# Patient Record
Sex: MALE | Race: WHITE | HISPANIC OR LATINO | Employment: OTHER | URBAN - METROPOLITAN AREA
[De-identification: names, ages, dates, MRNs, and addresses within clinical notes are randomized per-mention and may not be internally consistent; named-entity substitution may affect disease eponyms.]

---

## 2017-01-04 ENCOUNTER — OFFICE VISIT (OUTPATIENT)
Dept: CARDIOLOGY | Facility: MEDICAL CENTER | Age: 72
End: 2017-01-04
Payer: MEDICARE

## 2017-01-04 VITALS
BODY MASS INDEX: 25.48 KG/M2 | DIASTOLIC BLOOD PRESSURE: 90 MMHG | HEART RATE: 72 BPM | SYSTOLIC BLOOD PRESSURE: 144 MMHG | HEIGHT: 71 IN | OXYGEN SATURATION: 97 % | WEIGHT: 182 LBS

## 2017-01-04 DIAGNOSIS — E78.5 DYSLIPIDEMIA: ICD-10-CM

## 2017-01-04 DIAGNOSIS — I42.0 DILATED CARDIOMYOPATHY (HCC): ICD-10-CM

## 2017-01-04 DIAGNOSIS — Z13.6 SCREENING FOR ABDOMINAL AORTIC ANEURYSM: ICD-10-CM

## 2017-01-04 DIAGNOSIS — I10 ESSENTIAL HYPERTENSION: ICD-10-CM

## 2017-01-04 DIAGNOSIS — Z95.0 CARDIAC PACEMAKER IN SITU: ICD-10-CM

## 2017-01-04 PROCEDURE — 99214 OFFICE O/P EST MOD 30 MIN: CPT | Performed by: INTERNAL MEDICINE

## 2017-01-04 RX ORDER — CARVEDILOL 12.5 MG/1
12.5 TABLET ORAL 2 TIMES DAILY WITH MEALS
Qty: 180 TAB | Refills: 3 | Status: SHIPPED | OUTPATIENT
Start: 2017-01-04 | End: 2018-01-29 | Stop reason: SDUPTHER

## 2017-01-04 RX ORDER — LISINOPRIL 20 MG/1
20 TABLET ORAL DAILY
Qty: 90 TAB | Refills: 3 | Status: SHIPPED | OUTPATIENT
Start: 2017-01-04 | End: 2018-03-15 | Stop reason: SDUPTHER

## 2017-01-04 RX ORDER — ATORVASTATIN CALCIUM 10 MG/1
10 TABLET, FILM COATED ORAL
Qty: 90 TAB | Refills: 3 | Status: SHIPPED | OUTPATIENT
Start: 2017-01-04 | End: 2018-01-14 | Stop reason: SDUPTHER

## 2017-01-04 ASSESSMENT — ENCOUNTER SYMPTOMS
DIZZINESS: 1
ABDOMINAL PAIN: 0
DEPRESSION: 0
CLAUDICATION: 0
HEADACHES: 0
BLOOD IN STOOL: 0
PALPITATIONS: 0
MYALGIAS: 0
ORTHOPNEA: 0
PND: 0
NERVOUS/ANXIOUS: 0
FEVER: 0
SHORTNESS OF BREATH: 0
BLURRED VISION: 0

## 2017-01-04 NOTE — MR AVS SNAPSHOT
"César Holly   2017 9:45 AM   Office Visit   MRN: [id number]    Department:  [facility]   Dept Phone:  [phone]    Description:  Male : 1945   Provider:  Lola Zamudio M.D.           Reason for Visit     Follow-Up           Allergies as of 2017     No Known Allergies      You were diagnosed with     Dilated cardiomyopathy (HCC)   [003076]       Cardiac pacemaker in situ   [V45.01.ICD-9-CM]       Essential hypertension   [5624648]       Dyslipidemia   [588259]         Vital Signs     Blood Pressure Pulse Height Weight Body Mass Index Oxygen Saturation    144/90 mmHg 72 1.803 m (5' 10.98\") 82.555 kg (182 lb) 25.40 kg/m2 97%    Smoking Status                   Current Every Day Smoker           Basic Information     Date Of Birth Sex Race Ethnicity Preferred Language    1945 Male White  Origin (Ukrainian,Australian,Djiboutian,Comoran, etc) English      Your appointments     2017  2:15 PM   NM CARDIAC STRESS TEST (30) with [facility] NM FORTE 1   [facility]    [address] [facility] [id number] [phone]           NPO for 4 hours prior to scan.  No caffeine for 24 hours prior to test (decaf, coffee, cola, tea, chocolate, Excedrin, and Anacin).  No Viagra or other ED meds for 48 hours. No Beta Blockers prior to Treadmill Stress Test. Warn patient of length of test and to bring a list of meds.            Mar 01, 2017  9:30 AM   FOLLOW UP with Lola Zamudio M.D.   [facility] (--)    [address]  [facility] [address] [phone]            2017 10:00 AM   PACER CHECK ONLY with TYRELL Gómez   [facility] (--)    [address]  [facility] [address] [phone]            2017 10:30 AM   FOLLOW UP with Lola Zamudio M.D.   [facility] (--)    [address]  [place] [id number] [phone]              Problem List              ICD-10-CM Priority Class Noted - Resolved    Dilated cardiomyopathy (HCC) I42.0 High  4/2/2012 - Present    Chronic systolic heart failure (HCC) I50.22 High  4/2/2012 - Present    Impotence of organic origin (Chronic) N52.9 Low  Unknown - Present    History of heart block (Chronic) Z86.79 High  Unknown - Present    History of syncope (Chronic) Z87.898 High  Unknown - Present    Inguinal hernia  Low  3/27/2013 - Present    Cardiac pacemaker in situ Z95.0 High  4/4/2013 - Present    SDH (subdural hematoma) (HCC) I62.00 High  9/12/2015 - Present    Rib fracture S22.39XA Medium  9/12/2015 - Present    AC separation, type 3 S43.109A Medium  9/12/2015 - Present    Hypertension I10 Medium  9/12/2015 - Present    Trauma T14.90 Low  9/13/2015 - Present      Health Maintenance        Date Due Completion Dates    COLONOSCOPY 4/22/1994 ---    IMM ZOSTER VACCINE 4/22/2004 ---    IMM PNEUMOCOCCAL 65+ (ADULT) LOW/MEDIUM RISK SERIES (1 of 2 - PCV13) 4/22/2009 ---    IMM INFLUENZA (1) 9/1/2016 ---    IMM DTaP/Tdap/Td Vaccine (2 - Td) 9/27/2026 9/27/2016            Current Immunizations     Tdap Vaccine 9/27/2016  3:27 PM      Below and/or attached are the medications your provider expects you to take. Review all of your home medications and newly ordered medications with your provider and/or pharmacist. Follow medication instructions as directed by your provider and/or pharmacist. Please keep your medication list with you and share with your provider. Update the information when medications are discontinued, doses are changed, or new medications (including over-the-counter products) are added; and carry medication information at all times in the event of emergency situations     Allergies:  No Known Allergies          Medications  Valid as of: January 04, 2017 - 10:15 AM    Generic Name  Brand Name Tablet Size Instructions for use    Aspirin (Tab) aspirin 81 MG Take 81 mg by mouth every day.        Atorvastatin Calcium (Tab) LIPITOR 10 MG Take 1 Tab by mouth every bedtime.        Carvedilol (Tab) COREG 12.5 MG Take 1 Tab by mouth 2 times a day, with meals.        Hydrocodone-Acetaminophen (Tab) NORCO  MG Take 1-2 Tabs by mouth every 6 hours as needed.        Lisinopril (Tab) PRINIVIL 20 MG Take 1 Tab by mouth every day.        Omega-3 Fatty Acids (Cap) Omega-3 Fatty Acids 1200 MG Take  by mouth. AS DIRECTED.        .                 Medicines prescribed today were sent to:     [id number]  [place], [place] - [address] [facility] [address]    [address] [place] [address]    Phone: [phone] Fax: [phone]    Open 24 Hours?: No      Medication refill instructions:       If your prescription bottle indicates you have medication refills left, it is not necessary to call your provider’s office. Please contact your pharmacy and they will refill your medication.    If your prescription bottle indicates you do not have any refills left, you may request refills at any time through one of the following ways: The online [employer] system (except Urgent Care), by calling your provider’s office, or by asking your pharmacy to contact your provider’s office with a refill request. Medication refills are processed only during regular business hours and may not be available until the next business day. Your provider may request additional information or to have a follow-up visit with you prior to refilling your medication.   *Please Note: Medication refills are assigned a new Rx number when refilled electronically. Your pharmacy may indicate that no refills were authorized even though a new prescription for the same medication is available at the pharmacy. Please request the medicine by name with the pharmacy before contacting your provider for a refill.        Your To Do List      Future Labs/Procedures Complete By Expires    COMP METABOLIC PANEL  As directed 1/4/2018    LIPID PROFILE  As directed 1/4/2018    NM-CARDIAC STRESS TEST  As directed 1/4/2018         OnlineSheetMusic Access Code: [email or web address]  Expires: 1/6/2017  8:57 AM    MyChart  A secure, online tool to manage your health information     [employer] OnlineSheetMusic® is a secure, online tool that connects you to your personalized health information from the privacy of your home -- day or night - making it very easy for you to manage your healthcare. Once the activation process is completed, you can even access your medical information using the OnlineSheetMusic marcus, which is available for free in the Apple Marcus store or Google Play store.     OnlineSheetMusic provides the following levels of access (as shown below):   My Chart Features   [facility] Doctor [facility]  Specialists [facility]  Urgent  Care Non-[facility]  Primary Care  Doctor   Email your healthcare team securely and privately 24/7 X X X    Manage appointments: schedule your next appointment; view details of past/upcoming appointments X      Request prescription refills. X      View recent personal medical records, including lab and immunizations X X X X   View health record, including health history, allergies, medications X X X X   Read reports about your outpatient visits, procedures, consult and ER notes X X X X   See your discharge summary, which is a recap of your hospital and/or ER visit that includes your diagnosis, lab results, and care plan. X X       How to register for OnlineSheetMusic:  1. Go to  [email or web address].  2. Click on the Sign Up Now box, which takes you to the New Member Sign Up page. You will need to provide the following information:  a. Enter your OnlineSheetMusic Access Code exactly as it appears at the top of this page. (You will not need to use this code after you’ve completed the sign-up process. If you do not sign up before the expiration date, you must request a new  code.)   b. Enter your date of birth.   c. Enter your home email address.   d. Click Submit, and follow the next screen’s instructions.  3. Create a Smartpay ID. This will be your Smartpay login ID and cannot be changed, so think of one that is secure and easy to remember.  4. Create a Wowsait password. You can change your password at any time.  5. Enter your Password Reset Question and Answer. This can be used at a later time if you forget your password.   6. Enter your e-mail address. This allows you to receive e-mail notifications when new information is available in Smartpay.  7. Click Sign Up. You can now view your health information.    For assistance activating your Smartpay account, call [phone]

## 2017-01-04 NOTE — Clinical Note
[facility]   [address]  [place] [address]  Phone: [phone]  Fax: [phone]              César Holly  1945    Encounter Date: 1/4/2017    Lola Zamudio M.D.          PROGRESS NOTE:  Subjective:   César Holly is a 71 y.o. male with known history of alcohol-induced cardiomyopathy, complete heart block status post pacemaker, hypertension, dyslipidemia presenting today for follow-up evaluation of cardiomyopathy.    Patient still continues to smoke about half a pack to a pack a day. Denied any complaints of exertional chest pain, pressure or tightness. No complaints of dizziness lightheadedness or loss of consciousness. Denied any complaints of palpitations orthopnea or PND. No complaints of lower extremity edema or claudication. This complain of tinnitus along with dizzy spells Brittanie De Jesus advised him to use meclizine when he has episodes of vertigo. Patient denied any complaints of myalgias while on statins.  Past Medical History   Diagnosis Date   • Hypertension    • Arthritis    • Dilated cardiomyopathy (HCC) November 2011     Echocardiogram with normal LV size and mildly decrease LV funcion, LVEF 40-45%, mild concentric LVH. Mild TR.   • Chronic systolic heart failure (HCC)     • Impotence of organic origin    • History of heart block    • History of syncope    • High cholesterol    • Pacemaker      Past Surgical History   Procedure Laterality Date   • Other orthopedic surgery       right bicep repair   • Other orthopedic surgery       right foot   • Pacemaker insertion  August 2010     [employer] Altrua 60 [id number] implanted by Dr. Lemon.   • Recovery  4/29/2016     Procedure: CATH LAB PM GENERATOR CHANGE BEATRIS MACHADO;  Surgeon: [facility];  Location: SURGERY PRE-POST PROC UNIT [facility];  Service:    • Pacemaker insertion  April 2016     Generator replacement with [employer] Accolade [id number] implanted by  "Dr. Trevino.   • Pacemaker insertion     • Other orthopedic surgery       L knee   • Other orthopedic surgery       L shoulder     History reviewed. No pertinent family history.  History   Smoking status   • Current Every Day Smoker -- 0.50 packs/day for 50 years   • Types: Cigarettes   Smokeless tobacco   • Never Used     No Known Allergies  Outpatient Encounter Prescriptions as of 1/4/2017   Medication Sig Dispense Refill   • carvedilol (COREG) 25 MG Tab Take 1 Tab by mouth 2 times a day, with meals. 60 Tab 11   • atorvastatin (LIPITOR) 10 MG Tab Take 10 mg by mouth every evening.     • lisinopril (PRINIVIL) 20 MG Tab Take 1 Tab by mouth every day. 90 Tab 0   • hydrocodone/acetaminophen (NORCO)  MG Tab Take 1-2 Tabs by mouth every 6 hours as needed.     • aspirin 81 MG tablet Take 81 mg by mouth every day.     • Omega-3 Fatty Acids (FISH OIL) 1200 MG CAPS Take  by mouth. AS DIRECTED.       No facility-administered encounter medications on file as of 1/4/2017.     Review of Systems   Constitutional: Negative for fever.   HENT: Positive for tinnitus.    Eyes: Negative for blurred vision.   Respiratory: Negative for shortness of breath.    Cardiovascular: Negative for chest pain, palpitations, orthopnea, claudication, leg swelling and PND.   Gastrointestinal: Negative for abdominal pain, blood in stool and melena.   Genitourinary: Negative for dysuria.   Musculoskeletal: Positive for joint pain. Negative for myalgias.   Skin: Negative for rash.   Neurological: Positive for dizziness. Negative for headaches.   Psychiatric/Behavioral: Negative for depression. The patient is not nervous/anxious.         Objective:   /90 mmHg  Pulse 72  Ht 1.803 m (5' 10.98\")  Wt 82.555 kg (182 lb)  BMI 25.40 kg/m2  SpO2 97%  Blood pressure 122/80, heart rate 72 O2 sats 96% on room air  Physical Exam   Constitutional: He is oriented to person, place, and time. He appears well-developed and well-nourished. No distress.   "   HENT:   Head: Normocephalic and atraumatic.   Mouth/Throat: No oropharyngeal exudate.   Eyes: Pupils are equal, round, and reactive to light. Right eye exhibits no discharge. Left eye exhibits no discharge.   Neck: No JVD present. No tracheal deviation present.   Cardiovascular: Normal rate and regular rhythm.    No murmur heard.  Pulmonary/Chest: Effort normal and breath sounds normal. No respiratory distress. He has no wheezes. He has no rales. He exhibits no tenderness.   Abdominal: Soft. Bowel sounds are normal. He exhibits no distension. There is no tenderness. There is no rebound and no guarding.   Musculoskeletal: Normal range of motion. He exhibits no edema.   Neurological: He is alert and oriented to person, place, and time. No cranial nerve deficit.   Skin: Skin is warm and dry. He is not diaphoretic. No erythema.   Psychiatric: He has a normal mood and affect.   Results for JORDAN NASH (MRN [id number]) as of 2017 09:47   2016    Sodium 136 137   Potassium 3.6 4.4   Chloride 102 99   Co2 28 30   Anion Gap 6.0 8.0   Glucose 127 (H) 129 (H)   Bun 15 16   Creatinine 0.98 1.05   GFR If Non African American >60 >60   Calcium 10.1 9.8   AST(SGOT)  20   ALT(SGPT)  12   Alkaline Phosphatase  56   Cholesterol,Tot  186   Triglycerides  67   HDL  67   LDL  106 (H)     TTE: 16  No prior study is available for comparison.   Mildly reduced left ventricular systolic function. Left ventricular ejection fraction is visually estimated to be 45 %.   [place] appears akinetic. Grade I diastolic dysfunction. Abnormal septal motion consistent with ventricular pacing.   Trace mitral regurgitation.   Estimated right ventricular systolic pressure  is 27 mmHg.  No pericardial effusion seen.   No prior study is available for comparison    EK16  EKG personally reviewed by me.  AV paced at 60 bpm    Transthoracic echo: 11  Mildly depressed LV function with EF of 40-45%. Mild global hypokinetic  with endoscopy septal motion consistent with RV pacer. Mild concentric LVH. Grade 1 diastolic dysfunction.  No significant valvular heart disease.  Pulmonary artery systolic pressure 25-30 mmHg.  Assessment:     No diagnosis found.    Medical Decision Making:  Today's Assessment / Status / Plan:     1. Blood pressure well controlled at the present visit.  Continue Coreg 25 mg BID.  Continue lisinopril 20 mg daily.  2. Patient appears euvolemic.  Currently on beta blockers and ace inhibitors.  Echo prior to next visit to assess LV function.  3. Device interrogation was done today by Brittanie showed no mode switching episodes.  Normal sensing of RA lead; unable to measure R waves. Stable capture of RA and RV leads; stable impedances. Battery longevity is 8.5 years.  4. Continue Lipitor 10 mg qHs.  Check labs prior to next visit.  5. Abdominal aortic Doppler prior to next visit.    Follow-up in 6 weeks.  Echo, abdominal aortic Doppler and labs prior to next visit.    Thank you for allowing me to participate in taking care of patient.    Lola Zamudio. MD.         Zachary Granados M.D.  [address] [facility]  [address] [address]  VIA Facsimile: [phone]

## 2017-01-05 ENCOUNTER — TELEPHONE (OUTPATIENT)
Dept: CARDIOLOGY | Facility: MEDICAL CENTER | Age: 72
End: 2017-01-05

## 2017-01-05 NOTE — TELEPHONE ENCOUNTER
----- Message from Lola Zamudio M.D. sent at 1/4/2017 11:21 AM PST -----    Forgot to mention to patient that he needs abdominal aortic Doppler to the screening abdominal aortic aneurysm.  Please let him know one of our office staff will call and schedule him for abdominal aortic Doppler.

## 2017-01-10 ENCOUNTER — TELEPHONE (OUTPATIENT)
Dept: CARDIOLOGY | Facility: MEDICAL CENTER | Age: 72
End: 2017-01-10

## 2017-01-10 NOTE — TELEPHONE ENCOUNTER
Pt. notified echo unchanged from last time - keep March appt & Dr. Zamudio will discuss in detail.

## 2017-01-10 NOTE — TELEPHONE ENCOUNTER
----- Message from Lola Zamudio M.D. sent at 12/28/2016  5:36 PM PST -----  Left ventricular ejection fraction is visually estimated to be 45 %.   Surveyor appears akinetic. Grade I diastolic dysfunction. Abnormal septal motion consistent with ventricular pacing.   Trace mitral regurgitation.   Estimated right ventricular systolic pressure  is 27 mmHg.  Unchanged from prior echo  Ansiha  ----- Message -----     From: Willard Brady     Sent: 12/28/2016   5:35 PM       To: Lola Zamudio M.D.

## 2017-01-26 DIAGNOSIS — Z13.6 SCREENING FOR ABDOMINAL AORTIC ANEURYSM: ICD-10-CM

## 2017-01-27 DIAGNOSIS — I42.0 DILATED CARDIOMYOPATHY (HCC): ICD-10-CM

## 2017-01-31 ENCOUNTER — TELEPHONE (OUTPATIENT)
Dept: CARDIOLOGY | Facility: MEDICAL CENTER | Age: 72
End: 2017-01-31

## 2017-01-31 DIAGNOSIS — I42.0 DILATED CARDIOMYOPATHY (HCC): ICD-10-CM

## 2017-01-31 NOTE — TELEPHONE ENCOUNTER
----- Message from Lola Zamudio M.D. 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  No significant ischemia  Severely depressed LV systolic function with EF 14%, mid to apical lateral wall, apex and apical septum are mildly dyskinetic.    USG Aorta: 1/26/17  No evidence of aneurysm.    Significant drop in LVEF on stress test.    Lets get limited echo with contrast for evaluation of LVEF.    Lola  ----- Message -----     From: Lizbet Carson R.N.     Sent: 1/27/2017   8:49 AM       To: Lola Zamudio M.D.

## 2017-01-31 NOTE — PROGRESS NOTES
Nuclear stress test: 1/26/17  Medium sized inferoapical infarct extending to mid to apical septum.  No significant ischemia  Severely depressed LV systolic function with EF 14%, mid to apical lateral wall, apex and apical septum are mildly dyskinetic.    USG Aorta: 1/26/17  No evidence of aneurysm.

## 2017-01-31 NOTE — TELEPHONE ENCOUNTER
L/M for César to call back. I haven't placed the order for the limited echo w/ contrast yet - will wait until after he calls for results of MPI & Aorta US. Will need to explain the need for the new order for limited echo w/ contrast, as the echo from   11/22/16 showed EF 45% & now stress [place] showing 14%. He has [employer], so likely will need to go to [facility]

## 2017-02-13 ENCOUNTER — TELEPHONE (OUTPATIENT)
Dept: CARDIOLOGY | Facility: MEDICAL CENTER | Age: 72
End: 2017-02-13

## 2017-02-14 NOTE — TELEPHONE ENCOUNTER
----- Message from Rosalba Gallagher sent at 2/13/2017  3:45 PM PST -----  Regarding: [facility] needs order faxed over  AM/Yue Cabral @ [facility] called and said they need the order for the echo faxed. Fax #[phone], she can be reached at [phone]

## 2017-03-01 ENCOUNTER — OFFICE VISIT (OUTPATIENT)
Dept: CARDIOLOGY | Facility: MEDICAL CENTER | Age: 72
End: 2017-03-01
Payer: MEDICARE

## 2017-03-01 VITALS
BODY MASS INDEX: 26.05 KG/M2 | SYSTOLIC BLOOD PRESSURE: 122 MMHG | OXYGEN SATURATION: 98 % | WEIGHT: 182 LBS | HEIGHT: 70 IN | DIASTOLIC BLOOD PRESSURE: 72 MMHG | HEART RATE: 70 BPM

## 2017-03-01 DIAGNOSIS — I10 ESSENTIAL HYPERTENSION: ICD-10-CM

## 2017-03-01 DIAGNOSIS — E78.5 DYSLIPIDEMIA: ICD-10-CM

## 2017-03-01 DIAGNOSIS — I25.10 CORONARY ARTERY DISEASE INVOLVING NATIVE CORONARY ARTERY OF NATIVE HEART WITHOUT ANGINA PECTORIS: ICD-10-CM

## 2017-03-01 PROCEDURE — 99214 OFFICE O/P EST MOD 30 MIN: CPT | Performed by: INTERNAL MEDICINE

## 2017-03-01 ASSESSMENT — ENCOUNTER SYMPTOMS
HEADACHES: 0
PALPITATIONS: 0
DEPRESSION: 0
BLOOD IN STOOL: 0
ORTHOPNEA: 0
FEVER: 0
SHORTNESS OF BREATH: 0
MYALGIAS: 0
CLAUDICATION: 0
DIZZINESS: 1
BLURRED VISION: 0
NERVOUS/ANXIOUS: 0
PND: 0
ABDOMINAL PAIN: 0

## 2017-03-01 NOTE — PROGRESS NOTES
Subjective:   César Holly is a 71 y.o. male with known history of ischemic cardio myopathy LVEF of 45%, nuclear stress test from 1/17 showed medium sized inferoapical infarct from mid to apical septum, CHB s/p PPM, hypertension, dyslipidemia presenting today for follow-up evaluation of cardiomyopathy.    Patient currently in NYHA class II. Denied any complaints of exertional chest pain, pressure or tightness. No complaints of palpitations orthopnea or PND. Intermittent episodes of dizziness worse when he gets up from sitting to standing position but no loss of consciousness. Patient has successfully quit smoking but still continues to have intermittent urges to smoke offered either Chantix or Wellbutrin which patient wants to hold off for now.  Past Medical History   Diagnosis Date   • Hypertension    • Arthritis    • Dilated cardiomyopathy (CMS-HCC) November 2011     Echocardiogram with normal LV size and mildly decrease LV funcion, LVEF 40-45%, mild concentric LVH. Mild TR.   • Chronic systolic heart failure (CMS-HCC)     • Impotence of organic origin    • History of heart block    • History of syncope    • High cholesterol    • Pacemaker      Past Surgical History   Procedure Laterality Date   • Other orthopedic surgery       right bicep repair   • Other orthopedic surgery       right foot   • Pacemaker insertion  August 2010     [employer] Altrua 60 [id number] implanted by Dr. Lemon.   • Recovery  4/29/2016     Procedure: CATH LAB PM GENERATOR CHANGE BEATRIS TREVINO;  Surgeon: [facility];  Location: SURGERY PRE-POST PROC UNIT [place];  Service:    • Pacemaker insertion  April 2016     Generator replacement with [employer] Accolade L301 implanted by Dr. Trevino.   • Pacemaker insertion     • Other orthopedic surgery       L knee   • Other orthopedic surgery       L shoulder     No family history on file.  History   Smoking status   • Current Every Day Smoker -- 0.50 packs/day for 50 years   •  "Types: Cigarettes   Smokeless tobacco   • Never Used     No Known Allergies  Outpatient Encounter Prescriptions as of 3/1/2017   Medication Sig Dispense Refill   • lisinopril (PRINIVIL) 20 MG Tab Take 1 Tab by mouth every day. 90 Tab 3   • atorvastatin (LIPITOR) 10 MG Tab Take 1 Tab by mouth every bedtime. 90 Tab 3   • carvedilol (COREG) 12.5 MG Tab Take 1 Tab by mouth 2 times a day, with meals. 180 Tab 3   • aspirin 81 MG tablet Take 81 mg by mouth every day.     • Omega-3 Fatty Acids (FISH OIL) 1200 MG CAPS Take  by mouth. AS DIRECTED.     • hydrocodone/acetaminophen (NORCO)  MG Tab Take 1-2 Tabs by mouth every 6 hours as needed.       No facility-administered encounter medications on file as of 3/1/2017.     Review of Systems   Constitutional: Negative for fever.   HENT: Positive for tinnitus.    Eyes: Negative for blurred vision.   Respiratory: Negative for shortness of breath.    Cardiovascular: Negative for chest pain, palpitations, orthopnea, claudication, leg swelling and PND.   Gastrointestinal: Negative for abdominal pain, blood in stool and melena.   Genitourinary: Negative for dysuria.   Musculoskeletal: Positive for joint pain. Negative for myalgias.   Skin: Negative for rash.   Neurological: Positive for dizziness. Negative for headaches.   Psychiatric/Behavioral: Negative for depression. The patient is not nervous/anxious.         Objective:   /72 mmHg  Pulse 70  Ht 1.778 m (5' 10\")  Wt 82.555 kg (182 lb)  BMI 26.11 kg/m2  SpO2 98%    Physical Exam   Constitutional: He is oriented to person, place, and time. He appears well-developed and well-nourished. No distress.   HENT:   Head: Normocephalic and atraumatic.   Mouth/Throat: No oropharyngeal exudate.   Eyes: Pupils are equal, round, and reactive to light. Right eye exhibits no discharge. Left eye exhibits no discharge.   Neck: No JVD present. No tracheal deviation present.   Cardiovascular: Normal rate and regular rhythm.    No " murmur heard.  Pulses:       Carotid pulses are 2+ on the right side, and 2+ on the left side.       Radial pulses are 2+ on the right side, and 2+ on the left side.        Dorsalis pedis pulses are 2+ on the right side, and 2+ on the left side.        Posterior tibial pulses are 2+ on the right side, and 2+ on the left side.   Pulmonary/Chest: Effort normal and breath sounds normal. No respiratory distress. He has no wheezes. He has no rales. He exhibits no tenderness.   Abdominal: Soft. Bowel sounds are normal. He exhibits no distension. There is no tenderness. There is no rebound and no guarding.   Musculoskeletal: Normal range of motion. He exhibits no edema.   Neurological: He is alert and oriented to person, place, and time. No cranial nerve deficit.   Skin: Skin is warm and dry. He is not diaphoretic. No erythema.   Psychiatric: He has a normal mood and affect.   Nuclear stress test: 1/26/17  Medium sized inferoapical infarct extending to mid to apical septum.  No significant ischemia  Severely depressed LV systolic function with EF 14%, mid to apical lateral wall, apex and apical septum are mildly dyskinetic.    USG Aorta: 1/26/17  No evidence of aneurysm.    Results for JORDAN NASH (MRN [id number]) as of 1/4/2017 09:47   4/22/2016 12/16/2016    Sodium 136 137   Potassium 3.6 4.4   Chloride 102 99   Co2 28 30   Anion Gap 6.0 8.0   Glucose 127 (H) 129 (H)   Bun 15 16   Creatinine 0.98 1.05   GFR If Non African American >60 >60   Calcium 10.1 9.8   AST(SGOT)  20   ALT(SGPT)  12   Alkaline Phosphatase  56   Cholesterol,Tot  186   Triglycerides  67   HDL  67   LDL  106 (H)     TTE: 12/28/16  No prior study is available for comparison.   Mildly reduced left ventricular systolic function. Left ventricular ejection fraction is visually estimated to be 45 %.   [place] appears akinetic. Grade I diastolic dysfunction. Abnormal septal motion consistent with ventricular pacing.   Trace mitral regurgitation.   Estimated  right ventricular systolic pressure  is 27 mmHg.  No pericardial effusion seen.   No prior study is available for comparison    EK16  EKG personally reviewed by me.  AV paced at 60 bpm    Transthoracic echo: 11  Mildly depressed LV function with EF of 40-45%. Mild global hypokinetic with endoscopy septal motion consistent with RV pacer. Mild concentric LVH. Grade 1 diastolic dysfunction.  No significant valvular heart disease.  Pulmonary artery systolic pressure 25-30 mmHg.  Assessment:     1. Essential hypertension     2. Dyslipidemia  COMP METABOLIC PANEL    LIPID PROFILE    COMP METABOLIC PANEL    LIPID PROFILE   3. Coronary artery disease involving native coronary artery of native heart without angina pectoris         Medical Decision Making:  Today's Assessment / Status / Plan:     1. Patient appears euvolemic.   Continue Coreg 12.5 mg BID.  Continue lisinopril 20 mg daily.  Nuclear stress test showed LVEF of 14%.  Patient had a limited echo to assess LV function at [facility], we'll try to get the results.  2. Device was interrogated by Brittanie on 16 showed no evidence of mode switching. Normal sensing of RA lead. Stable capture of RA and RV leads with stable impedance. Patient is scheduled to see Brittanie in  for repeat pacemaker check.  3. Blood pressure well controlled at the present visit.  Continue Coreg 12. 5 mg BID.  Continue lisinopril 20 mg daily.  4. Continue Lipitor 10 mg qHs.  Check labs now and prior to next visit.    Follow-up with me in 6 months.  Will follow up echo results.  Check labs now.    Thank you for allowing me to participate in taking care of patient.    Lola Tomlin MD.

## 2017-03-01 NOTE — Clinical Note
[facility]   [address]  [place] [address]  Phone: [phone]  Fax: [phone]              César Holly  1945    Encounter Date: 3/1/2017    Lola Zamudio M.D.          PROGRESS NOTE:  Subjective:   César Holly is a 71 y.o. male with known history of ischemic cardio myopathy LVEF of 45%, nuclear stress test from 1/17 showed medium sized inferoapical infarct from mid to apical septum, CHB s/p PPM, hypertension, dyslipidemia presenting today for follow-up evaluation of cardiomyopathy.    Patient currently in NYHA class II. Denied any complaints of exertional chest pain, pressure or tightness. No complaints of palpitations orthopnea or PND. Intermittent episodes of dizziness worse when he gets up from sitting to standing position but no loss of consciousness.  Past Medical History   Diagnosis Date   • Hypertension    • Arthritis    • Dilated cardiomyopathy (CMS-HCC) November 2011     Echocardiogram with normal LV size and mildly decrease LV funcion, LVEF 40-45%, mild concentric LVH. Mild TR.   • Chronic systolic heart failure (CMS-HCC)     • Impotence of organic origin    • History of heart block    • History of syncope    • High cholesterol    • Pacemaker      Past Surgical History   Procedure Laterality Date   • Other orthopedic surgery       right bicep repair   • Other orthopedic surgery       right foot   • Pacemaker insertion  August 2010     [place] [employer] Altrua 60 [id number] implanted by Dr. Lemon.   • Recovery  4/29/2016     Procedure: CATH LAB PM GENERATOR CHANGE BEATRIS TREVINO;  Surgeon: [facility];  Location: SURGERY PRE-POST PROC UNIT [facility];  Service:    • Pacemaker insertion  April 2016     Generator replacement with [place] [employer] Accolade L301 implanted by Dr. Trevino.   • Pacemaker insertion     • Other orthopedic surgery       L knee   • Other orthopedic surgery       L shoulder     No family history  "on file.  History   Smoking status   • Current Every Day Smoker -- 0.50 packs/day for 50 years   • Types: Cigarettes   Smokeless tobacco   • Never Used     No Known Allergies  Outpatient Encounter Prescriptions as of 3/1/2017   Medication Sig Dispense Refill   • lisinopril (PRINIVIL) 20 MG Tab Take 1 Tab by mouth every day. 90 Tab 3   • atorvastatin (LIPITOR) 10 MG Tab Take 1 Tab by mouth every bedtime. 90 Tab 3   • carvedilol (COREG) 12.5 MG Tab Take 1 Tab by mouth 2 times a day, with meals. 180 Tab 3   • aspirin 81 MG tablet Take 81 mg by mouth every day.     • Omega-3 Fatty Acids (FISH OIL) 1200 MG CAPS Take  by mouth. AS DIRECTED.     • hydrocodone/acetaminophen (NORCO)  MG Tab Take 1-2 Tabs by mouth every 6 hours as needed.       No facility-administered encounter medications on file as of 3/1/2017.     Review of Systems   Constitutional: Negative for fever.   HENT: Positive for tinnitus.    Eyes: Negative for blurred vision.   Respiratory: Negative for shortness of breath.    Cardiovascular: Negative for chest pain, palpitations, orthopnea, claudication, leg swelling and PND.   Gastrointestinal: Negative for abdominal pain, blood in stool and melena.   Genitourinary: Negative for dysuria.   Musculoskeletal: Positive for joint pain. Negative for myalgias.   Skin: Negative for rash.   Neurological: Positive for dizziness. Negative for headaches.   Psychiatric/Behavioral: Negative for depression. The patient is not nervous/anxious.         Objective:   /72 mmHg  Pulse 70  Ht 1.778 m (5' 10\")  Wt 82.555 kg (182 lb)  BMI 26.11 kg/m2  SpO2 98%    Physical Exam   Constitutional: He is oriented to person, place, and time. He appears well-developed and well-nourished. No distress.   HENT:   Head: Normocephalic and atraumatic.   Mouth/Throat: No oropharyngeal exudate.   Eyes: Pupils are equal, round, and reactive to light. Right eye exhibits no discharge. Left eye exhibits no discharge.   Neck: No JVD " present. No tracheal deviation present.   Cardiovascular: Normal rate and regular rhythm.    No murmur heard.  Pulses:       Carotid pulses are 2+ on the right side, and 2+ on the left side.       Radial pulses are 2+ on the right side, and 2+ on the left side.        Dorsalis pedis pulses are 2+ on the right side, and 2+ on the left side.        Posterior tibial pulses are 2+ on the right side, and 2+ on the left side.   Pulmonary/Chest: Effort normal and breath sounds normal. No respiratory distress. He has no wheezes. He has no rales. He exhibits no tenderness.   Abdominal: Soft. Bowel sounds are normal. He exhibits no distension. There is no tenderness. There is no rebound and no guarding.   Musculoskeletal: Normal range of motion. He exhibits no edema.   Neurological: He is alert and oriented to person, place, and time. No cranial nerve deficit.   Skin: Skin is warm and dry. He is not diaphoretic. No erythema.   Psychiatric: He has a normal mood and affect.   Nuclear stress test: 1/26/17  Medium sized inferoapical infarct extending to mid to apical septum.  No significant ischemia  Severely depressed LV systolic function with EF 14%, mid to apical lateral wall, apex and apical septum are mildly dyskinetic.    USG Aorta: 1/26/17  No evidence of aneurysm.    Results for JORDAN NASH (MRN [id number]) as of 1/4/2017 09:47   4/22/2016 12/16/2016    Sodium 136 137   Potassium 3.6 4.4   Chloride 102 99   Co2 28 30   Anion Gap 6.0 8.0   Glucose 127 (H) 129 (H)   Bun 15 16   Creatinine 0.98 1.05   GFR If Non African American >60 >60   Calcium 10.1 9.8   AST(SGOT)  20   ALT(SGPT)  12   Alkaline Phosphatase  56   Cholesterol,Tot  186   Triglycerides  67   HDL  67   LDL  106 (H)     TTE: 12/28/16  No prior study is available for comparison.   Mildly reduced left ventricular systolic function. Left ventricular ejection fraction is visually estimated to be 45 %.   [place] appears akinetic. Grade I diastolic dysfunction.  Abnormal septal motion consistent with ventricular pacing.   Trace mitral regurgitation.   Estimated right ventricular systolic pressure  is 27 mmHg.  No pericardial effusion seen.   No prior study is available for comparison    EK16  EKG personally reviewed by me.  AV paced at 60 bpm    Transthoracic echo: 11  Mildly depressed LV function with EF of 40-45%. Mild global hypokinetic with endoscopy septal motion consistent with RV pacer. Mild concentric LVH. Grade 1 diastolic dysfunction.  No significant valvular heart disease.  Pulmonary artery systolic pressure 25-30 mmHg.  Assessment:     1. Essential hypertension     2. Dyslipidemia  COMP METABOLIC PANEL    LIPID PROFILE    COMP METABOLIC PANEL    LIPID PROFILE   3. Coronary artery disease involving native coronary artery of native heart without angina pectoris         Medical Decision Making:  Today's Assessment / Status / Plan:     1. Patient appears euvolemic.   Continue Coreg 12.5 mg BID.  Continue lisinopril 20 mg daily.  Nuclear stress test showed LVEF of 14%.  Patient had a limited echo to assess LV function at [facility], we'll try to get the results.  2. Device was interrogated by Brittanie on 16 showed no evidence of mode switching. Normal sensing of RA lead. Stable capture of RA and RV leads with stable impedance. Patient is scheduled to see Brittanie in  for repeat pacemaker check.  3. Blood pressure well controlled at the present visit.  Continue Coreg 12. 5 mg BID.  Continue lisinopril 20 mg daily.  4. Continue Lipitor 10 mg qHs.  Check labs now and prior to next visit.    Follow-up with me in 6 months.  Will follow up echo results.  Check labs now.    Thank you for allowing me to participate in taking care of patient.    Lola Zamudio. MD.      Zachary Granados M.D.  [address]  [address] [place] [address]  VIA Facsimile: [phone]

## 2017-03-01 NOTE — MR AVS SNAPSHOT
"        César Holly   3/1/2017 9:30 AM   Office Visit   MRN: [id number]    Department:  [facility]   Dept Phone:  [phone]    Description:  Male : 1945   Provider:  Lola Zamudio M.D.           Allergies as of 3/1/2017     No Known Allergies      You were diagnosed with     Essential hypertension   [4013907]       Dyslipidemia   [344434]       Coronary artery disease involving native coronary artery of native heart without angina pectoris   [5631619]         Vital Signs     Blood Pressure Pulse Height Weight Body Mass Index Oxygen Saturation    122/72 mmHg 70 1.778 m (5' 10\") 82.555 kg (182 lb) 26.11 kg/m2 98%    Smoking Status                   Current Every Day Smoker           Basic Information     Date Of Birth Sex Race Ethnicity Preferred Language    1945 Male White  Origin (Bar[place],Czech,C[place] Islander,[place]se, etc) English      Your appointments     2017  9:20 AM   PACER CHECK ONLY with RENZO Gómez.P.MARILIN   [facility] (--)    [address]  [place] [address]   [phone]            2017 10:00 AM   PACER CHECK ONLY with DHEERAJ GómezP.NBreezy   [facility] (--)    [address]  [place] [address] [phone]            2017 10:30 AM   FOLLOW UP with Lola Zamudio M.D.   [facility] (--)    [address]  [place] [address]   [phone]              Problem List              ICD-10-CM Priority Class Noted - Resolved    Dilated cardiomyopathy (CMS-HCC) I42.0 High  2012 - Present    Chronic systolic heart failure (CMS-HCC) I50.22 High  2012 - Present    Impotence of organic origin (Chronic) N52.9 Low  Unknown - Present    History of heart block (Chronic) Z86.79 High  Unknown - Present    History of syncope (Chronic) Z87.898 High  Unknown - " Present    Inguinal hernia  Low  3/27/2013 - Present    Cardiac pacemaker in situ Z95.0 High  4/4/2013 - Present    SDH (subdural hematoma) (CMS-HCC) I62.00 High  9/12/2015 - Present    Rib fracture S22.39XA Medium  9/12/2015 - Present    AC separation, type 3 S43.109A Medium  9/12/2015 - Present    Hypertension I10 Medium  9/12/2015 - Present    Trauma T14.90 Low  9/13/2015 - Present      Health Maintenance        Date Due Completion Dates    COLONOSCOPY 4/22/1994 ---    IMM ZOSTER VACCINE 4/22/2004 ---    IMM PNEUMOCOCCAL 65+ (ADULT) LOW/MEDIUM RISK SERIES (1 of 2 - PCV13) 4/22/2009 ---    IMM INFLUENZA (1) 9/1/2016 ---    IMM DTaP/Tdap/Td Vaccine (2 - Td) 9/27/2026 9/27/2016            Current Immunizations     Tdap Vaccine 9/27/2016  3:27 PM      Below and/or attached are the medications your provider expects you to take. Review all of your home medications and newly ordered medications with your provider and/or pharmacist. Follow medication instructions as directed by your provider and/or pharmacist. Please keep your medication list with you and share with your provider. Update the information when medications are discontinued, doses are changed, or new medications (including over-the-counter products) are added; and carry medication information at all times in the event of emergency situations     Allergies:  No Known Allergies          Medications  Valid as of: March 01, 2017 - 10:20 AM    Generic Name Brand Name Tablet Size Instructions for use    Aspirin (Tab) aspirin 81 MG Take 81 mg by mouth every day.        Atorvastatin Calcium (Tab) LIPITOR 10 MG Take 1 Tab by mouth every bedtime.        Carvedilol (Tab) COREG 12.5 MG Take 1 Tab by mouth 2 times a day, with meals.        Hydrocodone-Acetaminophen (Tab) NORCO  MG Take 1-2 Tabs by mouth every 6 hours as needed.        Lisinopril (Tab) PRINIVIL 20 MG Take 1 Tab by mouth every day.        Omega-3 Fatty Acids (Cap) Omega-3 Fatty Acids 1200 MG Take  by  mouth. AS DIRECTED.        .                 Medicines prescribed today were sent to:     [id number]  [place], [place] - [address] AT [facility] OF  [address] [place] [address]    Phone: [phone] Fax: [phone]    Open 24 Hours?: No      Medication refill instructions:       If your prescription bottle indicates you have medication refills left, it is not necessary to call your provider’s office. Please contact your pharmacy and they will refill your medication.    If your prescription bottle indicates you do not have any refills left, you may request refills at any time through one of the following ways: The online Art[employer] system (except Urgent Care), by calling your provider’s office, or by asking your pharmacy to contact your provider’s office with a refill request. Medication refills are processed only during regular business hours and may not be available until the next business day. Your provider may request additional information or to have a follow-up visit with you prior to refilling your medication.   *Please Note: Medication refills are assigned a new Rx number when refilled electronically. Your pharmacy may indicate that no refills were authorized even though a new prescription for the same medication is available at the pharmacy. Please request the medicine by name with the pharmacy before contacting your provider for a refill.        Your To Do List     Future Labs/Procedures Complete By Expires    COMP METABOLIC PANEL  As directed 3/1/2018    COMP METABOLIC PANEL  As directed 3/1/2018    LIPID PROFILE  As directed 3/1/2018    LIPID PROFILE  As directed 3/1/2018         Artisoft Access Code: [email or web address]  Expires: 3/11/2017  2:36 PM    Artisoft  A secure, online tool to manage your health information     [employer]’s Artisoft® is a secure, online tool that connects you to your personalized health information from the privacy of your home -- day or  night - making it very easy for you to manage your healthcare. Once the activation process is completed, you can even access your medical information using the FlexEnergy marcus, which is available for free in the [employer] Marcus store or Google Play store.     FlexEnergy provides the following levels of access (as shown below):   My Chart Features   [facility] Primary [facility] Doctor [facility]  Specialists [facility]  Urgent  Care Non-[facility]  Primary Care  Doctor   Email your healthcare team securely and privately 24/7 X X X    Manage appointments: schedule your next appointment; view details of past/upcoming appointments X      Request prescription refills. X      View recent personal medical records, including lab and immunizations X X X X   View health record, including health history, allergies, medications X X X X   Read reports about your outpatient visits, procedures, consult and ER notes X X X X   See your discharge summary, which is a recap of your hospital and/or ER visit that includes your diagnosis, lab results, and care plan. X X       How to register for FlexEnergy:  1. Go to  [email or web address].  2. Click on the Sign Up Now box, which takes you to the New Member Sign Up page. You will need to provide the following information:  a. Enter your FlexEnergy Access Code exactly as it appears at the top of this page. (You will not need to use this code after you’ve completed the sign-up process. If you do not sign up before the expiration date, you must request a new code.)   b. Enter your date of birth.   c. Enter your home email address.   d. Click Submit, and follow the next screen’s instructions.  3. Create a FlexEnergy ID. This will be your FlexEnergy login ID and cannot be changed, so think of one that is secure and easy to remember.  4. Create a FlexEnergy password. You can change your password at any time.  5. Enter your Password Reset Question and Answer. This can be used at a later time if you forget your password.   6. Enter your  e-mail address. This allows you to receive e-mail notifications when new information is available in [employer].  7. Click Sign Up. You can now view your health information.    For assistance activating your [employer] account, call [phone]

## 2017-03-02 DIAGNOSIS — I42.0 DILATED CARDIOMYOPATHY (HCC): ICD-10-CM

## 2017-03-03 NOTE — PROGRESS NOTES
Transthoracic: 3/1/17  Left ventricular ejection fraction 55%. Grade 1 diastolic dysfunction.  Mildly reduced right ventricular systolic function.  No evidence of valvular abnormality based on Doppler evaluation.  RVSP 30 mmHg.  Compared to prior study done on 12/16 LVEF has improved slightly.

## 2017-03-14 LAB
ALBUMIN SERPL-MCNC: 4.5 G/DL (ref 3.5–4.8)
ALBUMIN/GLOB SERPL: 1.9 {RATIO} (ref 1.2–2.2)
ALP SERPL-CCNC: 64 IU/L (ref 39–117)
ALT SERPL-CCNC: 15 IU/L (ref 0–44)
AST SERPL-CCNC: 20 IU/L (ref 0–40)
BILIRUB SERPL-MCNC: 0.7 MG/DL (ref 0–1.2)
BUN SERPL-MCNC: 9 MG/DL (ref 8–27)
BUN/CREAT SERPL: 9 (ref 10–22)
CALCIUM SERPL-MCNC: 9.9 MG/DL (ref 8.6–10.2)
CHLORIDE SERPL-SCNC: 99 MMOL/L (ref 96–106)
CHOLEST SERPL-MCNC: 135 MG/DL (ref 100–199)
CO2 SERPL-SCNC: 24 MMOL/L (ref 18–29)
COMMENT 011824: NORMAL
CREAT SERPL-MCNC: 0.98 MG/DL (ref 0.76–1.27)
GLOBULIN SER CALC-MCNC: 2.4 G/DL (ref 1.5–4.5)
GLUCOSE SERPL-MCNC: 116 MG/DL (ref 65–99)
HDLC SERPL-MCNC: 61 MG/DL
LDLC SERPL CALC-MCNC: 59 MG/DL (ref 0–99)
POTASSIUM SERPL-SCNC: 4.5 MMOL/L (ref 3.5–5.2)
PROT SERPL-MCNC: 6.9 G/DL (ref 6–8.5)
SODIUM SERPL-SCNC: 140 MMOL/L (ref 134–144)
TRIGL SERPL-MCNC: 73 MG/DL (ref 0–149)
VLDLC SERPL CALC-MCNC: 15 MG/DL (ref 5–40)

## 2017-03-15 ENCOUNTER — TELEPHONE (OUTPATIENT)
Dept: CARDIOLOGY | Facility: MEDICAL CENTER | Age: 72
End: 2017-03-15

## 2017-03-15 NOTE — TELEPHONE ENCOUNTER
Pt was notified. He also asked about his CMP & LP labs just done 3/13. I gave him the results. BS improved from 129 to 116. Total chol improved from 186 to 135. LDL improved from 106 to 59 on atorvastatin. He will continue current meds & f/u with Dr. Zamudio in June.

## 2017-03-15 NOTE — TELEPHONE ENCOUNTER
----- Message from Cheyenne Coats sent at 3/9/2017  5:11 PM PST -----      ----- Message -----     From: Lola Zamudio M.D.     Sent: 3/2/2017   6:20 PM       To: Lizbet Carson R.N.    Left ventricular ejection fraction 55%. Grade 1 diastolic dysfunction.  RVSP 30 mmHg.  Compared to prior study done on 12/16 LVEF has improved slightly.  ----- Message -----     From: Lizbet Carson R.N.     Sent: 3/2/2017  10:58 AM       To: Lola Zamudio M.D.

## 2017-04-16 ENCOUNTER — HOSPITAL ENCOUNTER (EMERGENCY)
Dept: HOSPITAL 8 - ED | Age: 72
Discharge: HOME | End: 2017-04-16
Payer: MEDICARE

## 2017-04-16 VITALS — SYSTOLIC BLOOD PRESSURE: 170 MMHG | DIASTOLIC BLOOD PRESSURE: 98 MMHG

## 2017-04-16 VITALS — WEIGHT: 189.38 LBS | BODY MASS INDEX: 26.51 KG/M2 | HEIGHT: 71 IN
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
## 2017-04-16 DIAGNOSIS — S51.811A: ICD-10-CM
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
## 2017-04-16 DIAGNOSIS — Z23: ICD-10-CM

## 2017-04-16 DIAGNOSIS — Z87.891: ICD-10-CM
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
## 2017-04-16 DIAGNOSIS — S62.181B: Primary | ICD-10-CM

## 2017-04-16 PROCEDURE — 73110 X-RAY EXAM OF WRIST: CPT

## 2017-04-16 PROCEDURE — 96372 THER/PROPH/DIAG INJ SC/IM: CPT

## 2017-04-16 PROCEDURE — 29125 APPL SHORT ARM SPLINT STATIC: CPT

## 2017-04-16 PROCEDURE — 12032 INTMD RPR S/A/T/EXT 2.6-7.5: CPT

## 2017-04-16 PROCEDURE — 73200 CT UPPER EXTREMITY W/O DYE: CPT

## 2017-04-16 PROCEDURE — 73130 X-RAY EXAM OF HAND: CPT

## 2017-04-16 PROCEDURE — 90715 TDAP VACCINE 7 YRS/> IM: CPT

## 2017-04-16 PROCEDURE — 99284 EMERGENCY DEPT VISIT MOD MDM: CPT

## 2017-04-16 PROCEDURE — 90471 IMMUNIZATION ADMIN: CPT

## 2017-05-04 ENCOUNTER — TELEPHONE (OUTPATIENT)
Dept: CARDIOLOGY | Facility: MEDICAL CENTER | Age: 72
End: 2017-05-04

## 2017-06-07 LAB
ALBUMIN SERPL-MCNC: 4 G/DL (ref 3.5–4.8)
ALBUMIN/GLOB SERPL: 1.7 {RATIO} (ref 1.2–2.2)
ALP SERPL-CCNC: 56 IU/L (ref 39–117)
ALT SERPL-CCNC: 12 IU/L (ref 0–44)
AST SERPL-CCNC: 14 IU/L (ref 0–40)
BILIRUB SERPL-MCNC: 0.9 MG/DL (ref 0–1.2)
BUN SERPL-MCNC: 13 MG/DL (ref 8–27)
BUN/CREAT SERPL: 15 (ref 10–24)
CALCIUM SERPL-MCNC: 10.2 MG/DL (ref 8.6–10.2)
CHLORIDE SERPL-SCNC: 101 MMOL/L (ref 96–106)
CHOLEST SERPL-MCNC: 108 MG/DL (ref 100–199)
CO2 SERPL-SCNC: 19 MMOL/L (ref 18–29)
COMMENT 011824: NORMAL
CREAT SERPL-MCNC: 0.89 MG/DL (ref 0.76–1.27)
GLOBULIN SER CALC-MCNC: 2.4 G/DL (ref 1.5–4.5)
GLUCOSE SERPL-MCNC: 101 MG/DL (ref 65–99)
HDLC SERPL-MCNC: 60 MG/DL
LDLC SERPL CALC-MCNC: 30 MG/DL (ref 0–99)
POTASSIUM SERPL-SCNC: 4.2 MMOL/L (ref 3.5–5.2)
PROT SERPL-MCNC: 6.4 G/DL (ref 6–8.5)
SODIUM SERPL-SCNC: 137 MMOL/L (ref 134–144)
TRIGL SERPL-MCNC: 90 MG/DL (ref 0–149)
VLDLC SERPL CALC-MCNC: 18 MG/DL (ref 5–40)

## 2017-06-14 ENCOUNTER — OFFICE VISIT (OUTPATIENT)
Dept: CARDIOLOGY | Facility: MEDICAL CENTER | Age: 72
End: 2017-06-14
Payer: MEDICARE

## 2017-06-14 ENCOUNTER — NON-PROVIDER VISIT (OUTPATIENT)
Dept: CARDIOLOGY | Facility: MEDICAL CENTER | Age: 72
End: 2017-06-14
Payer: MEDICARE

## 2017-06-14 VITALS
HEIGHT: 71 IN | BODY MASS INDEX: 25.06 KG/M2 | OXYGEN SATURATION: 93 % | WEIGHT: 179 LBS | DIASTOLIC BLOOD PRESSURE: 74 MMHG | HEART RATE: 78 BPM | SYSTOLIC BLOOD PRESSURE: 128 MMHG

## 2017-06-14 DIAGNOSIS — E78.5 DYSLIPIDEMIA: ICD-10-CM

## 2017-06-14 DIAGNOSIS — Z95.0 CARDIAC PACEMAKER IN SITU: ICD-10-CM

## 2017-06-14 DIAGNOSIS — I42.0 DILATED CARDIOMYOPATHY (HCC): ICD-10-CM

## 2017-06-14 DIAGNOSIS — Z86.79 HISTORY OF HEART BLOCK: Chronic | ICD-10-CM

## 2017-06-14 PROCEDURE — 93288 INTERROG EVL PM/LDLS PM IP: CPT | Performed by: NURSE PRACTITIONER

## 2017-06-14 PROCEDURE — 99214 OFFICE O/P EST MOD 30 MIN: CPT | Performed by: INTERNAL MEDICINE

## 2017-06-14 ASSESSMENT — ENCOUNTER SYMPTOMS
ORTHOPNEA: 0
FEVER: 0
NERVOUS/ANXIOUS: 0
SHORTNESS OF BREATH: 0
ABDOMINAL PAIN: 0
PND: 0
DIZZINESS: 0
BLOOD IN STOOL: 0
BLURRED VISION: 0
DEPRESSION: 0
MYALGIAS: 0
PALPITATIONS: 0
CLAUDICATION: 0
HEADACHES: 0

## 2017-06-14 NOTE — Clinical Note
[facility]   [address]   [address]  [place] [address]  Phone: [phone]  Fax: [phone]              César Holly  1945    Encounter Date: 6/14/2017    Lola Zamudio M.D.          PROGRESS NOTE:  Subjective:   César Holly is a 71 y.o. male with known history of ischemic cardio myopathy, nuclear stress test from 1/17 showed medium sized inferoapical infarct from mid to apical septum and LVEF of 15%, CHB s/p PPM, hypertension, dyslipidemia presenting today for follow-up evaluation of cardiomyopathy.    Patient currently in NYHA class II. Has been compliant with all of his medications. Still continues to smoke a pack of cigarettes which last for 2 days. Patient not yet ready to quit smoking. Denied any complaints of exertional chest pain pressure or tightness. No complaints of myalgias while on statins. Denied any complaints of cough. No complaints of dizziness lightheadedness or LOC.  Past Medical History   Diagnosis Date   • Hypertension    • Arthritis    • Dilated cardiomyopathy (CMS-HCC) March 2017     Echocardiogram with normal LV size, LVEF 55%. Trace TR. RVSP 30mmHg.   • Chronic systolic heart failure (CMS-HCC)     • Impotence of organic origin    • History of heart block    • History of syncope    • High cholesterol    • Pacemaker      Past Surgical History   Procedure Laterality Date   • Other orthopedic surgery       right bicep repair   • Other orthopedic surgery       right foot   • Pacemaker insertion  August 2010     [employer] Altrua 60 [id number] implanted by Dr. Lemon.   • Recovery  4/29/2016     Procedure: CATH LAB PM GENERATOR CHANGE BEATRIS TREVINO;  Surgeon: [facility];  Location: SURGERY PRE-POST PROC UNIT [facility];  Service:    • Pacemaker insertion  April 2016     Generator replacement with [employer] Accolade [id number] implanted by Dr. Trevino.   • Pacemaker insertion     • Other orthopedic  "surgery       L knee   • Other orthopedic surgery       L shoulder     History reviewed. No pertinent family history.  History   Smoking status   • Current Every Day Smoker -- 0.50 packs/day for 50 years   • Types: Cigarettes   Smokeless tobacco   • Never Used     No Known Allergies  Outpatient Encounter Prescriptions as of 6/14/2017   Medication Sig Dispense Refill   • atorvastatin (LIPITOR) 10 MG Tab Take 1 Tab by mouth every bedtime. 90 Tab 3   • carvedilol (COREG) 12.5 MG Tab Take 1 Tab by mouth 2 times a day, with meals. 180 Tab 3   • aspirin 81 MG tablet Take 81 mg by mouth every day.     • Omega-3 Fatty Acids (FISH OIL) 1200 MG CAPS Take  by mouth. AS DIRECTED.     • lisinopril (PRINIVIL) 20 MG Tab Take 1 Tab by mouth every day. 90 Tab 3   • hydrocodone/acetaminophen (NORCO)  MG Tab Take 1-2 Tabs by mouth every 6 hours as needed.       No facility-administered encounter medications on file as of 6/14/2017.     Review of Systems   Constitutional: Negative for fever.   HENT: Positive for tinnitus (left ear).    Eyes: Negative for blurred vision.   Respiratory: Negative for shortness of breath.    Cardiovascular: Negative for chest pain, palpitations, orthopnea, claudication, leg swelling and PND.   Gastrointestinal: Negative for abdominal pain, blood in stool and melena.   Genitourinary: Negative for dysuria.   Musculoskeletal: Positive for joint pain. Negative for myalgias.   Skin: Negative for rash.   Neurological: Negative for dizziness and headaches.   Psychiatric/Behavioral: Negative for depression. The patient is not nervous/anxious.         Objective:   /74 mmHg  Pulse 78  Ht 1.803 m (5' 10.98\")  Wt 81.194 kg (179 lb)  BMI 24.98 kg/m2  SpO2 93%    Physical Exam   Constitutional: He is oriented to person, place, and time. He appears well-developed and well-nourished. No distress.   HENT:   Head: Normocephalic and atraumatic.   Mouth/Throat: No oropharyngeal exudate.   Eyes: Pupils are " equal, round, and reactive to light. Right eye exhibits no discharge. Left eye exhibits no discharge.   Neck: No JVD present. No tracheal deviation present.   Cardiovascular: Normal rate and regular rhythm.    No murmur heard.  Pulses:       Carotid pulses are 2+ on the right side, and 2+ on the left side.       Radial pulses are 2+ on the right side, and 2+ on the left side.        Dorsalis pedis pulses are 2+ on the right side, and 2+ on the left side.        Posterior tibial pulses are 2+ on the right side, and 2+ on the left side.   Pulmonary/Chest: Effort normal and breath sounds normal. No respiratory distress. He has no wheezes. He has no rales. He exhibits no tenderness.   Abdominal: Soft. Bowel sounds are normal. He exhibits no distension. There is no tenderness. There is no rebound and no guarding.   Musculoskeletal: Normal range of motion. He exhibits no edema.   Neurological: He is alert and oriented to person, place, and time. No cranial nerve deficit.   Skin: Skin is warm and dry. He is not diaphoretic. No erythema.   Psychiatric: He has a normal mood and affect.   Results for JORDAN NASH (MRN [id number]) as of 6/14/2017 18:55   3/13/2017  6/6/2017    Sodium 140 137   Potassium 4.5 4.2   Chloride 99 101   Co2 24 19   Glucose 116 (H) 101 (H)   Bun 9 13   Creatinine 0.98 0.89   GFR If Non African American 77 85   Bun-Creatinine Ratio 9 (L) 15   Calcium 9.9 10.2   AST(SGOT) 20 14   ALT(SGPT) 15 12   Alkaline Phosphatase 64 56   Cholesterol,Tot 135 108   Triglycerides 73 90   HDL 61 60   LDL 59 30     Transthoracic echo: 3/1/17  Technically difficult study adequate information obtained.  LVEF 55%. Grade 1 diastolic dysfunction.  Mildly reduced) systolic function. No evidence of valvular abnormality based on Doppler evaluation.  Estimated) systolic pressure is 30 mmHg.  Compared to study of 12/2016 LVEF has improved slightly (was 45%).    Nuclear stress test: 1/26/17  Medium sized inferoapical infarct  extending to mid to apical septum.  No significant ischemia  Severely depressed LV systolic function with EF 14%, mid to apical lateral wall, apex and apical septum are mildly dyskinetic.    USG Aorta: 17  No evidence of aneurysm.    TTE: 16  No prior study is available for comparison.   Mildly reduced left ventricular systolic function. Left ventricular ejection fraction is visually estimated to be 45 %.   [place] appears akinetic. Grade I diastolic dysfunction. Abnormal septal motion consistent with ventricular pacing.   Trace mitral regurgitation.   Estimated right ventricular systolic pressure  is 27 mmHg.  No pericardial effusion seen.   No prior study is available for comparison    EK16  EKG personally reviewed by me.  AV paced at 60 bpm    Transthoracic echo: 11  Mildly depressed LV function with EF of 40-45%. Mild global hypokinetic with endoscopy septal motion consistent with RV pacer. Mild concentric LVH. Grade 1 diastolic dysfunction.  No significant valvular heart disease.  Pulmonary artery systolic pressure 25-30 mmHg.  Assessment:     1. Cardiac pacemaker in situ     2. Dilated cardiomyopathy (CMS-HCC)     3. History of heart block     4. Dyslipidemia  5. Hypertension    Medical Decision Making:  Today's Assessment / Status / Plan:     1. Devic was interrogated by Brittanie De Jesus today.  Device is working normally. No mode switching episodes.  Normal sensing of RA lead; unable to measure R waves. Stable capture of RA and RV leads; stable impedances  2. Since nuclear stress test showed significant drop in LVEF a limited echocardiogram was performed which showed LVEF of 50%.  Patient appears euvolemic.   Continue Coreg 12.5 mg BID.  Continue lisinopril 20 mg daily.  3. S/p PPM.  4. Continue Lipitor 10 mg qHs.  LDL less than 70.  5. Blood pressure well controlled at the present visit.  Continue Coreg 12.5 mg  BID.  Continue lisinopril 20 mg daily.    Follow-up with me in one year.  Will  labs prior to next visit.    Thank you for allowing me to participate in taking care of patient.    Lola Zamudio. MD.    Pre op note to  [phone] fax office number [phone]    Luis Granados M.D.  [address] [facility]  [address] [address]  VIA Facsimile: [phone]

## 2017-06-14 NOTE — MR AVS SNAPSHOT
César Holly   2017 10:00 AM   Non-Provider Visit   MRN: [id number]    Department:  [facility]   Dept Phone:  [phone]    Description:  Male : 1945   Provider:  TYRELL Gómez           Reason for Visit     Pacemaker Check/Dysfunction           Allergies as of 2017     No Known Allergies      You were diagnosed with     Cardiac pacemaker in situ   [V45.01.ICD-9-CM]       History of heart block   [899112]         Vital Signs     Smoking Status                   Current Every Day Smoker           Basic Information     Date Of Birth Sex Race Ethnicity Preferred Language    1945 Male White  Origin (French,[place]inamese,Portuguese,Citizen of [place], etc) English      Your appointments     2017 10:30 AM   FOLLOW UP with Lola Zamudio M.D.   [facility] ([facility]    [address]  [address]  [place] [id number] [phone]            Dec 18, 2017 10:00 AM   PACER CHECK ONLY with TYRELL Gómez   [facility] (--)    [address]  [address]  [place] [id number] [phone]              Problem List              ICD-10-CM Priority Class Noted - Resolved    Dilated cardiomyopathy (CMS-HCC) I42.0 High  2012 - Present    Chronic systolic heart failure (CMS-HCC) I50.22 High  2012 - Present    Impotence of organic origin (Chronic) N52.9 Low  Unknown - Present    History of heart block (Chronic) Z86.79 High  Unknown - Present    History of syncope (Chronic) Z87.898 High  Unknown - Present    Inguinal hernia  Low  3/27/2013 - Present    Cardiac pacemaker in situ Z95.0 High  2013 - Present    SDH (subdural hematoma) (CMS-HCC) I62.00 High  2015 - Present    Rib fracture S22.39XA Medium  2015 - Present    AC separation, type 3 S43.109A Medium  2015 - Present    Hypertension I10 Medium  2015 - Present    Trauma T14.90 Low   9/13/2015 - Present      Health Maintenance        Date Due Completion Dates    COLONOSCOPY 4/22/1994 ---    IMM ZOSTER VACCINE 4/22/2004 ---    IMM PNEUMOCOCCAL 65+ (ADULT) LOW/MEDIUM RISK SERIES (1 of 2 - PCV13) 4/22/2009 ---    IMM DTaP/Tdap/Td Vaccine (2 - Td) 9/27/2026 9/27/2016            Current Immunizations     Tdap Vaccine 9/27/2016  3:27 PM      Below and/or attached are the medications your provider expects you to take. Review all of your home medications and newly ordered medications with your provider and/or pharmacist. Follow medication instructions as directed by your provider and/or pharmacist. Please keep your medication list with you and share with your provider. Update the information when medications are discontinued, doses are changed, or new medications (including over-the-counter products) are added; and carry medication information at all times in the event of emergency situations     Allergies:  No Known Allergies          Medications  Valid as of: June 14, 2017 - 10:26 AM    Generic Name Brand Name Tablet Size Instructions for use    Aspirin (Tab) aspirin 81 MG Take 81 mg by mouth every day.        Atorvastatin Calcium (Tab) LIPITOR 10 MG Take 1 Tab by mouth every bedtime.        Carvedilol (Tab) COREG 12.5 MG Take 1 Tab by mouth 2 times a day, with meals.        Hydrocodone-Acetaminophen (Tab) NORCO  MG Take 1-2 Tabs by mouth every 6 hours as needed.        Lisinopril (Tab) PRINIVIL 20 MG Take 1 Tab by mouth every day.        Omega-3 Fatty Acids (Cap) Omega-3 Fatty Acids 1200 MG Take  by mouth. AS DIRECTED.        .                 Medicines prescribed today were sent to:     [id number]  [place], [place] - [address] AT [facility] [place] [address]    Phone: [phone] Fax: [phone]    Open 24 Hours?: No      Medication refill instructions:       If your prescription bottle indicates you have medication refills left, it is  not necessary to call your provider’s office. Please contact your pharmacy and they will refill your medication.    If your prescription bottle indicates you do not have any refills left, you may request refills at any time through one of the following ways: The online [employer] [place] system (except Urgent Care), by calling your provider’s office, or by asking your pharmacy to contact your provider’s office with a refill request. Medication refills are processed only during regular business hours and may not be available until the next business day. Your provider may request additional information or to have a follow-up visit with you prior to refilling your medication.   *Please Note: Medication refills are assigned a new Rx number when refilled electronically. Your pharmacy may indicate that no refills were authorized even though a new prescription for the same medication is available at the pharmacy. Please request the medicine by name with the pharmacy before contacting your provider for a refill.           [employer] [place] Access Code: [email or web address]  Expires: 7/5/2017  3:57 AM    [employer] [place]  A secure, online tool to manage your health information     [employer] [place]® is a secure, online tool that connects you to your personalized health information from the privacy of your home -- day or night - making it very easy for you to manage your healthcare. Once the activation process is completed, you can even access your medical information using the [employer] [place] marcus, which is available for free in the Apple Marcus store or Google Play store.     [employer] [place] provides the following levels of access (as shown below):   My Chart Features   [facility] Primary Care Doctor [facility]  Specialists [facility]  Urgent  Care Non-[facility]  Primary Care  Doctor   Email your healthcare team securely and privately 24/7 X X X    Manage appointments: schedule your next appointment; view details of past/upcoming appointments X      Request prescription refills. X       View recent personal medical records, including lab and immunizations X X X X   View health record, including health history, allergies, medications X X X X   Read reports about your outpatient visits, procedures, consult and ER notes X X X X   See your discharge summary, which is a recap of your hospital and/or ER visit that includes your diagnosis, lab results, and care plan. X X       How to register for [employer]:  1. Go to  [email or web address].  2. Click on the Sign Up Now box, which takes you to the New Member Sign Up page. You will need to provide the following information:  a. Enter your [employer] Access Code exactly as it appears at the top of this page. (You will not need to use this code after you’ve completed the sign-up process. If you do not sign up before the expiration date, you must request a new code.)   b. Enter your date of birth.   c. Enter your home email address.   d. Click Submit, and follow the next screen’s instructions.  3. Create a [employer] ID. This will be your [employer] login ID and cannot be changed, so think of one that is secure and easy to remember.  4. Create a [employer] password. You can change your password at any time.  5. Enter your Password Reset Question and Answer. This can be used at a later time if you forget your password.   6. Enter your e-mail address. This allows you to receive e-mail notifications when new information is available in [employer].  7. Click Sign Up. You can now view your health information.    For assistance activating your [employer] account, call [phone]        Quit Tobacco Information     Do you want to quit using tobacco?    Quitting tobacco decreases risks of cancer, heart and lung disease, increases life expectancy, improves sense of taste and smell, and increases spending money, among other benefits.    If you are thinking about quitting, we can help.  • [facility] Quit Tobacco Program: [phone]  o Program occurs weekly for four weeks and  includes pharmacist consultation on products to support quitting smoking or chewing tobacco. A provider referral is needed for pharmacist consultation.  • Tobacco Users Help Hotline: 3-322-QUIT-NOW (855-8240) or https://[place].quitlogix.org/  o Free, confidential telephone and online coaching for [place] residents. Sessions are designed on a schedule that is convenient for you. Eligible clients receive free nicotine replacement therapy.  • Nationally: www.smokefree.gov  o Information and professional assistance to support both immediate and long-term needs as you become, and remain, a non-smoker. Smokefree.gov allows you to choose the help that best fits your needs.

## 2017-06-14 NOTE — PROGRESS NOTES
Subjective:   César Holly is a 71 y.o. male with known history of ischemic cardio myopathy, nuclear stress test from 1/17 showed medium sized inferoapical infarct from mid to apical septum and LVEF of 15%, CHB s/p PPM, hypertension, dyslipidemia presenting today for follow-up evaluation of cardiomyopathy.    Patient currently in NYHA class II. Has been compliant with all of his medications. Still continues to smoke a pack of cigarettes which last for 2 days. Patient not yet ready to quit smoking. Denied any complaints of exertional chest pain pressure or tightness. No complaints of myalgias while on statins. Denied any complaints of cough. No complaints of dizziness lightheadedness or LOC. Patient is also requesting to send a clearance letter to orthopedic surgeon prior to L thumb ORIF.     Past Medical History   Diagnosis Date   • Hypertension    • Arthritis    • Dilated cardiomyopathy (CMS-HCC) March 2017     Echocardiogram with normal LV size, LVEF 55%. Trace TR. RVSP 30mmHg.   • Chronic systolic heart failure (CMS-HCC)     • Impotence of organic origin    • History of heart block    • History of syncope    • High cholesterol    • Pacemaker      Past Surgical History   Procedure Laterality Date   • Other orthopedic surgery       right bicep repair   • Other orthopedic surgery       right foot   • Pacemaker insertion  August 2010     [employer] Altrua 60 [id number] implanted by Dr. Lemon.   • Recovery  4/29/2016     Procedure: CATH LAB PM GENERATOR CHANGE BEATRIS TREVINO;  Surgeon: Recoveryonly Surgery;  [place] [facility] [place]  [place]:    • Pacemaker insertion  April 2016     Generator replacement with [employer] Accolade [id number] implanted by Dr. Trevino.   • Pacemaker insertion     • Other orthopedic surgery       L knee   • Other orthopedic surgery       L shoulder     History reviewed. No pertinent family history.  History   Smoking status   • Current Every Day Smoker -- 0.50 packs/day  "for 50 years   • Types: Cigarettes   Smokeless tobacco   • Never Used     No Known Allergies  Outpatient Encounter Prescriptions as of 6/14/2017   Medication Sig Dispense Refill   • atorvastatin (LIPITOR) 10 MG Tab Take 1 Tab by mouth every bedtime. 90 Tab 3   • carvedilol (COREG) 12.5 MG Tab Take 1 Tab by mouth 2 times a day, with meals. 180 Tab 3   • aspirin 81 MG tablet Take 81 mg by mouth every day.     • Omega-3 Fatty Acids (FISH OIL) 1200 MG CAPS Take  by mouth. AS DIRECTED.     • lisinopril (PRINIVIL) 20 MG Tab Take 1 Tab by mouth every day. 90 Tab 3   • hydrocodone/acetaminophen (NORCO)  MG Tab Take 1-2 Tabs by mouth every 6 hours as needed.       No facility-administered encounter medications on file as of 6/14/2017.     Review of Systems   Constitutional: Negative for fever.   HENT: Positive for tinnitus (left ear).    Eyes: Negative for blurred vision.   Respiratory: Negative for shortness of breath.    Cardiovascular: Negative for chest pain, palpitations, orthopnea, claudication, leg swelling and PND.   Gastrointestinal: Negative for abdominal pain, blood in stool and melena.   Genitourinary: Negative for dysuria.   Musculoskeletal: Positive for joint pain. Negative for myalgias.   Skin: Negative for rash.   Neurological: Negative for dizziness and headaches.   Psychiatric/Behavioral: Negative for depression. The patient is not nervous/anxious.         Objective:   /74 mmHg  Pulse 78  Ht 1.803 m (5' 10.98\")  Wt 81.194 kg (179 lb)  BMI 24.98 kg/m2  SpO2 93%    Physical Exam   Constitutional: He is oriented to person, place, and time. He appears well-developed and well-nourished. No distress.   HENT:   Head: Normocephalic and atraumatic.   Mouth/Throat: No oropharyngeal exudate.   Eyes: Pupils are equal, round, and reactive to light. Right eye exhibits no discharge. Left eye exhibits no discharge.   Neck: No JVD present. No tracheal deviation present.   Cardiovascular: Normal rate and " regular rhythm.    No murmur heard.  Pulses:       Carotid pulses are 2+ on the right side, and 2+ on the left side.       Radial pulses are 2+ on the right side, and 2+ on the left side.        Dorsalis pedis pulses are 2+ on the right side, and 2+ on the left side.        Posterior tibial pulses are 2+ on the right side, and 2+ on the left side.   Pulmonary/Chest: Effort normal and breath sounds normal. No respiratory distress. He has no wheezes. He has no rales. He exhibits no tenderness.   Abdominal: Soft. Bowel sounds are normal. He exhibits no distension. There is no tenderness. There is no rebound and no guarding.   Musculoskeletal: Normal range of motion. He exhibits no edema.   Neurological: He is alert and oriented to person, place, and time. No cranial nerve deficit.   Skin: Skin is warm and dry. He is not diaphoretic. No erythema.   Psychiatric: He has a normal mood and affect.   Results for JORDAN NASH (MRN [id number]) as of 6/14/2017 18:55   3/13/2017  6/6/2017    Sodium 140 137   Potassium 4.5 4.2   Chloride 99 101   Co2 24 19   Glucose 116 (H) 101 (H)   Bun 9 13   Creatinine 0.98 0.89   GFR If Non African American 77 85   Bun-Creatinine Ratio 9 (L) 15   Calcium 9.9 10.2   AST(SGOT) 20 14   ALT(SGPT) 15 12   Alkaline Phosphatase 64 56   Cholesterol,Tot 135 108   Triglycerides 73 90   HDL 61 60   LDL 59 30     Transthoracic echo: 3/1/17  Technically difficult study adequate information obtained.  LVEF 55%. Grade 1 diastolic dysfunction.  Mildly reduced) systolic function. No evidence of valvular abnormality based on Doppler evaluation.  Estimated) systolic pressure is 30 mmHg.  Compared to study of 12/2016 LVEF has improved slightly (was 45%).    Nuclear stress test: 1/26/17  Medium sized inferoapical infarct extending to mid to apical septum.  No significant ischemia  Severely depressed LV systolic function with EF 14%, mid to apical lateral wall, apex and apical septum are mildly dyskinetic.    USG  Aorta: 17  No evidence of aneurysm.    TTE: 16  No prior study is available for comparison.   Mildly reduced left ventricular systolic function. Left ventricular ejection fraction is visually estimated to be 45 %.   [place] appears akinetic. Grade I diastolic dysfunction. Abnormal septal motion consistent with ventricular pacing.   Trace mitral regurgitation.   Estimated right ventricular systolic pressure  is 27 mmHg.  No pericardial effusion seen.   No prior study is available for comparison    EK16  EKG personally reviewed by me.  AV paced at 60 bpm    Transthoracic echo: 11  Mildly depressed LV function with EF of 40-45%. Mild global hypokinetic with endoscopy septal motion consistent with RV pacer. Mild concentric LVH. Grade 1 diastolic dysfunction.  No significant valvular heart disease.  Pulmonary artery systolic pressure 25-30 mmHg.  Assessment:     1. Cardiac pacemaker in situ     2. Dilated cardiomyopathy (CMS-HCC)     3. History of heart block     4. Dyslipidemia  5. Hypertension    Medical Decision Making:  Today's Assessment / Status / Plan:     1. Devic was interrogated by Brittanie De Jesus today.  Device is working normally. No mode switching episodes.  Normal sensing of RA lead; unable to measure R waves. Stable capture of RA and RV leads; stable impedances  2. Since nuclear stress test showed significant drop in LVEF a limited echocardiogram was performed which showed LVEF of 50%.  Patient appears euvolemic.   Continue Coreg 12.5 mg BID.  Continue lisinopril 20 mg daily.  3. S/p PPM.  4. Continue Lipitor 10 mg qHs.  LDL less than 70.  5. Blood pressure well controlled at the present visit.  Continue Coreg 12.5 mg  BID.  Continue lisinopril 20 mg daily.  6. Patient is intermediate risk candidate being scheduled for a low risk procedure no further cardiac workup needed prior to surgery.    Follow-up with me in one year.  Will labs prior to next visit.    Thank you for allowing me to  participate in taking care of patient.    Lola Zamudio. MD.    Pre op note to  [phone] fax office number [phone]    Luis hopson

## 2017-06-14 NOTE — MR AVS SNAPSHOT
"        César Falcondez   2017 10:30 AM   Office Visit   MRN: [id number]    Department:  [facility]   Dept Phone:  [phone]    Description:  Male : 1945   Provider:  Lola Zamudio M.D.           Reason for Visit     Pacemaker Check/Dysfunction           Allergies as of 2017     No Known Allergies      You were diagnosed with     Cardiac pacemaker in situ   [V45.01.ICD-9-CM]       Dilated cardiomyopathy (CMS-HCC)   [872987]       History of heart block   [487056]       Dyslipidemia   [654253]         Vital Signs     Blood Pressure Pulse Height Weight Body Mass Index Oxygen Saturation    128/74 mmHg 78 1.803 m (5' 10.98\") 81.194 kg (179 lb) 24.98 kg/m2 93%    Smoking Status                   Current Every Day Smoker           Basic Information     Date Of Birth Sex Race Ethnicity Preferred Language    1945 Male White  Origin (Macedonian,Cuban,Kosovan,Tashi, etc) English      Your appointments     Dec 18, 2017 10:00 AM   PACER CHECK ONLY with Brittanie De Jesus, A.P.NBreezy   [facility]    [address]  [address]  [facility] [address] [phone]              Problem List              ICD-10-CM Priority Class Noted - Resolved    Dilated cardiomyopathy (CMS-HCC) I42.0 High  2012 - Present    Chronic systolic heart failure (CMS-HCC) I50.22 High  2012 - Present    Impotence of organic origin (Chronic) N52.9 Low  Unknown - Present    History of heart block (Chronic) Z86.79 High  Unknown - Present    History of syncope (Chronic) Z87.898 High  Unknown - Present    Inguinal hernia  Low  3/27/2013 - Present    Cardiac pacemaker in situ Z95.0 High  2013 - Present    SDH (subdural hematoma) (CMS-HCC) I62.00 High  2015 - Present    Rib fracture S22.39XA Medium  2015 - Present    AC separation, type 3 S43.109A Medium  2015 - Present    Hypertension I10 Medium  2015 - Present    Trauma T14.90 Low "  9/13/2015 - Present      Health Maintenance        Date Due Completion Dates    COLONOSCOPY 4/22/1994 ---    IMM ZOSTER VACCINE 4/22/2004 ---    IMM PNEUMOCOCCAL 65+ (ADULT) LOW/MEDIUM RISK SERIES (1 of 2 - PCV13) 4/22/2009 ---    IMM DTaP/Tdap/Td Vaccine (2 - Td) 9/27/2026 9/27/2016            Current Immunizations     Tdap Vaccine 9/27/2016  3:27 PM      Below and/or attached are the medications your provider expects you to take. Review all of your home medications and newly ordered medications with your provider and/or pharmacist. Follow medication instructions as directed by your provider and/or pharmacist. Please keep your medication list with you and share with your provider. Update the information when medications are discontinued, doses are changed, or new medications (including over-the-counter products) are added; and carry medication information at all times in the event of emergency situations     Allergies:  No Known Allergies          Medications  Valid as of: June 14, 2017 - 11:20 AM    Generic Name Brand Name Tablet Size Instructions for use    Aspirin (Tab) aspirin 81 MG Take 81 mg by mouth every day.        Atorvastatin Calcium (Tab) LIPITOR 10 MG Take 1 Tab by mouth every bedtime.        Carvedilol (Tab) COREG 12.5 MG Take 1 Tab by mouth 2 times a day, with meals.        Hydrocodone-Acetaminophen (Tab) NORCO  MG Take 1-2 Tabs by mouth every 6 hours as needed.        Lisinopril (Tab) PRINIVIL 20 MG Take 1 Tab by mouth every day.        Omega-3 Fatty Acids (Cap) Omega-3 Fatty Acids 1200 MG Take  by mouth. AS DIRECTED.        .                 Medicines prescribed today were sent to:     [id number]  [place], [place] - [address] AT [facility] [address] [address] [address]    Phone: [phone] Fax: [phone]    Open 24 Hours?: No      Medication refill instructions:       If your prescription bottle indicates you have medication refills left, it is  not necessary to call your provider’s office. Please contact your pharmacy and they will refill your medication.    If your prescription bottle indicates you do not have any refills left, you may request refills at any time through one of the following ways: The online [employer] system (except Urgent Care), by calling your provider’s office, or by asking your pharmacy to contact your provider’s office with a refill request. Medication refills are processed only during regular business hours and may not be available until the next business day. Your provider may request additional information or to have a follow-up visit with you prior to refilling your medication.   *Please Note: Medication refills are assigned a new Rx number when refilled electronically. Your pharmacy may indicate that no refills were authorized even though a new prescription for the same medication is available at the pharmacy. Please request the medicine by name with the pharmacy before contacting your provider for a refill.        Your To Do List     Future Labs/Procedures Complete By Expires    COMP METABOLIC PANEL  As directed 6/14/2018    LIPID PROFILE  As directed 6/14/2018         [employer] Access Code: [email or web address]  Expires: 7/5/2017  3:57 AM    [employer]  A secure, online tool to manage your health information     [employer]’s [employer]® is a secure, online tool that connects you to your personalized health information from the privacy of your home -- day or night - making it very easy for you to manage your healthcare. Once the activation process is completed, you can even access your medical information using the [employer] marcus, which is available for free in the Apple Marcus store or Google Play store.     [employer] provides the following levels of access (as shown below):   My Chart Features   [facility] Doctor [facility]  Specialists [facility]  Urgent  Care Non-[facility]  Primary Care  Doctor   Email your healthcare team securely and  privately 24/7 X X X    Manage appointments: schedule your next appointment; view details of past/upcoming appointments X      Request prescription refills. X      View recent personal medical records, including lab and immunizations X X X X   View health record, including health history, allergies, medications X X X X   Read reports about your outpatient visits, procedures, consult and ER notes X X X X   See your discharge summary, which is a recap of your hospital and/or ER visit that includes your diagnosis, lab results, and care plan. X X       How to register for [employer]:  1. Go to  [email or web address].  2. Click on the Sign Up Now box, which takes you to the New Member Sign Up page. You will need to provide the following information:  a. Enter your [employer] Access Code exactly as it appears at the top of this page. (You will not need to use this code after you’ve completed the sign-up process. If you do not sign up before the expiration date, you must request a new code.)   b. Enter your date of birth.   c. Enter your home email address.   d. Click Submit, and follow the next screen’s instructions.  3. Create a [employer] Systems ID. This will be your [employer] login ID and cannot be changed, so think of one that is secure and easy to remember.  4. Create a [employer] password. You can change your password at any time.  5. Enter your Password Reset Question and Answer. This can be used at a later time if you forget your password.   6. Enter your e-mail address. This allows you to receive e-mail notifications when new information is available in [employer].  7. Click Sign Up. You can now view your health information.    For assistance activating your [employer] account, call [phone]        Quit Tobacco Information     Do you want to quit using tobacco?    Quitting tobacco decreases risks of cancer, heart and lung disease, increases life expectancy, improves sense of taste and smell, and increases spending money, among  other benefits.    If you are thinking about quitting, we can help.  • [facility] Quit Tobacco Program: 683.847.6308  o Program occurs weekly for four weeks and includes pharmacist consultation on products to support quitting smoking or chewing tobacco. A provider referral is needed for pharmacist consultation.  • Tobacco Users Help Hotline: 1-800-QUIT-NOW (591-9688) or https://[place].quitlogix.org/  o Free, confidential telephone and online coaching for [place] residents. Sessions are designed on a schedule that is convenient for you. Eligible clients receive free nicotine replacement therapy.  • Nationally: www.smokefree.gov  o Information and professional assistance to support both immediate and long-term needs as you become, and remain, a non-smoker. Smokefree.gov allows you to choose the help that best fits your needs.

## 2017-06-14 NOTE — PROGRESS NOTES
Device is working normally. No mode switching episodes.  Normal sensing of RA lead; unable to measure R waves. Stable capture of RA and RV leads; stable impedances. Battery longevity is 7.5 years.  No changes are made today.    FU in 6 months for next PM check with me. He does see Dr. Zamudio today too.    Collaborating MD: Robb

## 2018-01-14 DIAGNOSIS — E78.5 DYSLIPIDEMIA: ICD-10-CM

## 2018-01-19 RX ORDER — ATORVASTATIN CALCIUM 10 MG/1
TABLET, FILM COATED ORAL
Qty: 90 TAB | Refills: 1 | Status: SHIPPED | OUTPATIENT
Start: 2018-01-19 | End: 2018-07-24 | Stop reason: SDUPTHER

## 2018-01-29 DIAGNOSIS — I42.0 DILATED CARDIOMYOPATHY (HCC): ICD-10-CM

## 2018-01-30 RX ORDER — CARVEDILOL 12.5 MG/1
TABLET ORAL
Qty: 180 TAB | Refills: 1 | Status: SHIPPED | OUTPATIENT
Start: 2018-01-30 | End: 2018-08-13 | Stop reason: SDUPTHER

## 2018-03-15 DIAGNOSIS — I42.0 DILATED CARDIOMYOPATHY (HCC): ICD-10-CM

## 2018-03-15 RX ORDER — LISINOPRIL 20 MG/1
20 TABLET ORAL DAILY
Qty: 90 TAB | Refills: 1 | Status: SHIPPED | OUTPATIENT
Start: 2018-03-15

## 2018-07-24 DIAGNOSIS — E78.5 DYSLIPIDEMIA: ICD-10-CM

## 2018-07-24 RX ORDER — ATORVASTATIN CALCIUM 10 MG/1
10 TABLET, FILM COATED ORAL
Qty: 90 TAB | Refills: 0 | Status: SHIPPED | OUTPATIENT
Start: 2018-07-24

## 2018-08-03 ENCOUNTER — TELEPHONE (OUTPATIENT)
Dept: CARDIOLOGY | Facility: MEDICAL CENTER | Age: 73
End: 2018-08-03

## 2018-08-13 DIAGNOSIS — I42.0 DILATED CARDIOMYOPATHY (HCC): ICD-10-CM

## 2018-08-13 RX ORDER — CARVEDILOL 12.5 MG/1
12.5 TABLET ORAL 2 TIMES DAILY WITH MEALS
Qty: 60 TAB | Refills: 0 | Status: SHIPPED | OUTPATIENT
Start: 2018-08-13 | End: 2018-09-09 | Stop reason: SDUPTHER

## 2018-08-27 ENCOUNTER — TELEPHONE (OUTPATIENT)
Dept: CARDIOLOGY | Facility: MEDICAL CENTER | Age: 73
End: 2018-08-27

## 2018-08-27 NOTE — TELEPHONE ENCOUNTER
----- Message from Juan Soria, Med Ass't sent at 8/27/2018  1:59 PM PDT -----  Regarding: RE: needs appt  Sorry about the delay but patient is out of network and we cannot schedule them.     Thanks!  Juan [phone]  ----- Message -----  From: Faustina Kathleen, Med Ass't  Sent: 7/24/2018   9:37 AM  To: [facility] Schedulers Pool  Subject: needs appt                                       Please call patient for follow up appointment. Thank you

## 2018-09-09 DIAGNOSIS — I42.0 DILATED CARDIOMYOPATHY (HCC): ICD-10-CM

## 2018-09-10 RX ORDER — CARVEDILOL 12.5 MG/1
TABLET ORAL
Qty: 60 TAB | Refills: 0 | Status: SHIPPED | OUTPATIENT
Start: 2018-09-10

## 2021-01-14 DIAGNOSIS — Z23 NEED FOR VACCINATION: ICD-10-CM

## 2021-05-14 ENCOUNTER — APPOINTMENT (RX ONLY)
Dept: URBAN - METROPOLITAN AREA CLINIC 4 | Facility: CLINIC | Age: 76
Setting detail: DERMATOLOGY
End: 2021-05-14

## 2021-05-14 DIAGNOSIS — L30.9 DERMATITIS, UNSPECIFIED: ICD-10-CM

## 2021-05-14 PROCEDURE — ? PRESCRIPTION

## 2021-05-14 PROCEDURE — ? COUNSELING

## 2021-05-14 PROCEDURE — ? REFERRAL CORRESPONDENCE

## 2021-05-14 PROCEDURE — 99203 OFFICE O/P NEW LOW 30 MIN: CPT | Mod: 25

## 2021-05-14 PROCEDURE — ? ORDER TESTS

## 2021-05-14 PROCEDURE — ? ADDITIONAL NOTES

## 2021-05-14 PROCEDURE — 11105 PUNCH BX SKIN EA SEP/ADDL: CPT

## 2021-05-14 PROCEDURE — 11104 PUNCH BX SKIN SINGLE LESION: CPT

## 2021-05-14 PROCEDURE — ? BIOPSY BY PUNCH METHOD

## 2021-05-14 RX ORDER — TRIAMCINOLONE ACETONIDE 1 MG/G
THIN LAYER CREAM TOPICAL BID
Qty: 1 | Refills: 0 | Status: ERX | COMMUNITY
Start: 2021-05-14

## 2021-05-14 RX ADMIN — TRIAMCINOLONE ACETONIDE THIN LAYER: 1 CREAM TOPICAL at 00:00

## 2021-05-14 ASSESSMENT — LOCATION DETAILED DESCRIPTION DERM
LOCATION DETAILED: LEFT DISTAL POSTERIOR UPPER ARM
LOCATION DETAILED: LEFT VENTRAL LATERAL PROXIMAL FOREARM
LOCATION DETAILED: LEFT INFERIOR MEDIAL UPPER BACK
LOCATION DETAILED: RIGHT INFERIOR LATERAL MIDBACK
LOCATION DETAILED: RIGHT PROXIMAL DORSAL FOREARM

## 2021-05-14 ASSESSMENT — LOCATION SIMPLE DESCRIPTION DERM
LOCATION SIMPLE: LEFT UPPER BACK
LOCATION SIMPLE: RIGHT FOREARM
LOCATION SIMPLE: LEFT UPPER ARM
LOCATION SIMPLE: RIGHT LOWER BACK
LOCATION SIMPLE: LEFT FOREARM

## 2021-05-14 ASSESSMENT — LOCATION ZONE DERM
LOCATION ZONE: TRUNK
LOCATION ZONE: ARM

## 2021-05-14 NOTE — PROCEDURE: ADDITIONAL NOTES
Additional Notes: Consulted with Dr Lovelace who assed the pt and discussed ddx including drug eruption on the trunk and potential drug induced photodermatitis of the bilateral arms. Recommended topical steroid course and 2 punch biopsies for further info, with cbc to assess for eosinophilia/infection.
Render Risk Assessment In Note?: no
Detail Level: Detailed

## 2021-05-14 NOTE — PROCEDURE: ORDER TESTS
Billing Type: Third-Party Bill
Performing Laboratory: [id number]
Bill For Surgical Tray: no
Expected Date Of Service: 05/14/2021

## 2021-05-14 NOTE — PROCEDURE: BIOPSY BY PUNCH METHOD

## 2022-05-18 ENCOUNTER — APPOINTMENT (RX ONLY)
Dept: URBAN - METROPOLITAN AREA CLINIC 22 | Facility: CLINIC | Age: 77
Setting detail: DERMATOLOGY
End: 2022-05-18

## 2022-05-18 DIAGNOSIS — L30.8 OTHER SPECIFIED DERMATITIS: ICD-10-CM

## 2022-05-18 PROCEDURE — 99214 OFFICE O/P EST MOD 30 MIN: CPT

## 2022-05-18 PROCEDURE — ? ADDITIONAL NOTES

## 2022-05-18 PROCEDURE — ? DIAGNOSIS COMMENT

## 2022-05-18 PROCEDURE — ? COUNSELING

## 2022-05-18 PROCEDURE — ? PRESCRIPTION

## 2022-05-18 RX ORDER — BETAMETHASONE DIPROPIONATE 0.5 MG/G
1 OINTMENT TOPICAL BID
Qty: 45 | Refills: 3 | Status: ERX | COMMUNITY
Start: 2022-05-18

## 2022-05-18 RX ADMIN — BETAMETHASONE DIPROPIONATE 1: 0.5 OINTMENT TOPICAL at 00:00

## 2022-05-18 NOTE — PROCEDURE: ADDITIONAL NOTES
Detail Level: Simple
Render Risk Assessment In Note?: no
Additional Notes: Recommended stopping Tide laundry detergent, will give patient samples of All Free & Clear to start switching out his detergent \\nHe was given a copy of his biopsy results showing his rash is a caused by a possible drug reaction. We asked him to have a discussion with Dr. MAK and his cardiologist Dr. Jason to discuss ruling out which medication could be causing his rash however will also consider contact dermatitis.

## 2022-05-18 NOTE — PROCEDURE: DIAGNOSIS COMMENT
Render Risk Assessment In Note?: no
Comment: PERIVASCULAR DERMATITIS, WITH EOSINOPHILS, BX PROVEN
Detail Level: Detailed

## 2022-05-18 NOTE — HPI: RASH
Is The Patient Presenting As Previously Scheduled?: No, they are a work-in
How Severe Is Your Rash?: moderate
Is This A New Presentation, Or A Follow-Up?: Rash
Additional History: Previously bx proven as perivascular dermatitis w/eosinophils.   Patient had two punch bxs.  He states that his topical medications have worked.  Symptoms were better and then got worse again but not as severe at initial presentation to clinic.     He is not currently on any treatments.

## 2022-06-15 ENCOUNTER — APPOINTMENT (RX ONLY)
Dept: URBAN - METROPOLITAN AREA CLINIC 22 | Facility: CLINIC | Age: 77
Setting detail: DERMATOLOGY
End: 2022-06-15

## 2022-06-15 DIAGNOSIS — L30.8 OTHER SPECIFIED DERMATITIS: ICD-10-CM

## 2022-06-15 PROCEDURE — 99214 OFFICE O/P EST MOD 30 MIN: CPT

## 2022-06-15 PROCEDURE — ? ADDITIONAL NOTES

## 2022-06-15 PROCEDURE — ? DIAGNOSIS COMMENT

## 2022-06-15 PROCEDURE — ? PRESCRIPTION

## 2022-06-15 PROCEDURE — ? COUNSELING

## 2022-06-15 RX ORDER — BETAMETHASONE DIPROPIONATE 0.5 MG/G
1 OINTMENT TOPICAL BID
Qty: 45 | Refills: 3 | Status: ERX

## 2022-06-15 ASSESSMENT — BSA RASH: BSA RASH: 12

## 2022-06-15 ASSESSMENT — SEVERITY ASSESSMENT: SEVERITY: MILD

## 2022-06-15 NOTE — PROCEDURE: MIPS QUALITY
Quality 130: Documentation Of Current Medications In The Medical Record: Current Medications Documented
Quality 226: Preventive Care And Screening: Tobacco Use: Screening And Cessation Intervention: Patient screened for tobacco use and is an ex/non-smoker
Detail Level: Detailed
Quality 111:Pneumonia Vaccination Status For Older Adults: Pneumococcal vaccine administered on or after patient’s 60th birthday and before the end of the measurement period

## 2022-06-15 NOTE — PROCEDURE: ADDITIONAL NOTES
Detail Level: Simple
Render Risk Assessment In Note?: no
Additional Notes: Patient has done well with topical CS and changing his laundry detergents.  He is mostly clear. \\nDiscussed if rash continues to occur however will need to investigate possible allergic causes, contact or drug.

## 2022-09-14 ENCOUNTER — APPOINTMENT (RX ONLY)
Dept: URBAN - METROPOLITAN AREA CLINIC 22 | Facility: CLINIC | Age: 77
Setting detail: DERMATOLOGY
End: 2022-09-14

## 2022-09-14 DIAGNOSIS — L82.1 OTHER SEBORRHEIC KERATOSIS: ICD-10-CM

## 2022-09-14 DIAGNOSIS — L30.8 OTHER SPECIFIED DERMATITIS: ICD-10-CM | Status: IMPROVED

## 2022-09-14 PROCEDURE — ? DIAGNOSIS COMMENT

## 2022-09-14 PROCEDURE — ? ADDITIONAL NOTES

## 2022-09-14 PROCEDURE — ? TREATMENT REGIMEN

## 2022-09-14 PROCEDURE — ? COUNSELING

## 2022-09-14 PROCEDURE — ? PRESCRIPTION

## 2022-09-14 PROCEDURE — 99213 OFFICE O/P EST LOW 20 MIN: CPT

## 2022-09-14 RX ORDER — BETAMETHASONE DIPROPIONATE 0.5 MG/G
1 OINTMENT TOPICAL BID
Qty: 45 | Refills: 5 | Status: ERX

## 2022-09-14 ASSESSMENT — LOCATION DETAILED DESCRIPTION DERM: LOCATION DETAILED: RIGHT MEDIAL UPPER BACK

## 2022-09-14 ASSESSMENT — LOCATION SIMPLE DESCRIPTION DERM: LOCATION SIMPLE: RIGHT UPPER BACK

## 2022-09-14 ASSESSMENT — SEVERITY ASSESSMENT: SEVERITY: MILD

## 2022-09-14 ASSESSMENT — LOCATION ZONE DERM: LOCATION ZONE: TRUNK

## 2022-09-14 NOTE — PROCEDURE: ADDITIONAL NOTES
Detail Level: Simple
Render Risk Assessment In Note?: no
Additional Notes: Patient has done well with topical CS and changing his laundry detergents.  He is mostly clear. \\nRefill betamethasone, patient uses 4-5 days at a time and then takes breaks.  He finds this regimen works well for him

## 2023-02-08 ENCOUNTER — RX ONLY (OUTPATIENT)
Age: 78
Setting detail: RX ONLY
End: 2023-02-08

## 2023-02-08 RX ORDER — BETAMETHASONE DIPROPIONATE 0.5 MG/G
OINTMENT TOPICAL
Qty: 45 | Refills: 1 | Status: ERX

## 2023-04-11 ENCOUNTER — APPOINTMENT (RX ONLY)
Dept: URBAN - METROPOLITAN AREA CLINIC 22 | Facility: CLINIC | Age: 78
Setting detail: DERMATOLOGY
End: 2023-04-11

## 2023-04-11 DIAGNOSIS — L30.8 OTHER SPECIFIED DERMATITIS: ICD-10-CM | Status: WELL CONTROLLED

## 2023-04-11 PROCEDURE — 99213 OFFICE O/P EST LOW 20 MIN: CPT

## 2023-04-11 PROCEDURE — ? TREATMENT REGIMEN

## 2023-04-11 PROCEDURE — ? PRESCRIPTION

## 2023-04-11 PROCEDURE — ? ADDITIONAL NOTES

## 2023-04-11 PROCEDURE — ? DIAGNOSIS COMMENT

## 2023-04-11 PROCEDURE — ? COUNSELING

## 2023-04-11 RX ORDER — BETAMETHASONE DIPROPIONATE 0.5 MG/G
1 OINTMENT TOPICAL BID
Qty: 45 | Refills: 5 | Status: ERX

## 2023-04-11 ASSESSMENT — SEVERITY ASSESSMENT: SEVERITY: MILD

## 2023-04-11 ASSESSMENT — LOCATION DETAILED DESCRIPTION DERM
LOCATION DETAILED: RIGHT RADIAL PALM
LOCATION DETAILED: LEFT THENAR EMINENCE

## 2023-04-11 ASSESSMENT — LOCATION ZONE DERM: LOCATION ZONE: HAND

## 2023-04-11 ASSESSMENT — ITCH NUMERIC RATING SCALE: HOW SEVERE IS YOUR ITCHING?: 4

## 2023-04-11 ASSESSMENT — LOCATION SIMPLE DESCRIPTION DERM
LOCATION SIMPLE: LEFT HAND
LOCATION SIMPLE: RIGHT HAND

## 2023-04-11 NOTE — PROCEDURE: ADDITIONAL NOTES
Detail Level: Simple
Render Risk Assessment In Note?: no
Additional Notes: Continues to achieve good control with betamethasone and feels he only needs to use for a few days to get control for quite some time.
Additional Notes: Will use his betamethasone, will use under occlusion with cotton gloves for 3-4 nights.

## 2023-09-27 RX ORDER — BETAMETHASONE DIPROPIONATE 0.5 MG/G
1 OINTMENT TOPICAL BID
Qty: 45 | Refills: 5 | Status: ERX

## 2024-04-09 RX ORDER — BETAMETHASONE DIPROPIONATE 0.5 MG/G
1 OINTMENT TOPICAL BID
Qty: 45 | Refills: 5 | Status: ERX

## 2024-05-29 ENCOUNTER — APPOINTMENT (RX ONLY)
Dept: URBAN - METROPOLITAN AREA CLINIC 22 | Facility: CLINIC | Age: 79
Setting detail: DERMATOLOGY
End: 2024-05-29

## 2024-05-29 DIAGNOSIS — L21.8 OTHER SEBORRHEIC DERMATITIS: ICD-10-CM

## 2024-05-29 DIAGNOSIS — L30.8 OTHER SPECIFIED DERMATITIS: ICD-10-CM

## 2024-05-29 PROCEDURE — ? TREATMENT REGIMEN

## 2024-05-29 PROCEDURE — 99214 OFFICE O/P EST MOD 30 MIN: CPT

## 2024-05-29 PROCEDURE — ? PRESCRIPTION

## 2024-05-29 PROCEDURE — ? DIAGNOSIS COMMENT

## 2024-05-29 PROCEDURE — ? COUNSELING

## 2024-05-29 PROCEDURE — ? ADDITIONAL NOTES

## 2024-05-29 RX ORDER — BETAMETHASONE DIPROPIONATE 0.5 MG/G
1 OINTMENT TOPICAL BID
Qty: 45 | Refills: 6 | Status: ERX

## 2024-05-29 ASSESSMENT — LOCATION DETAILED DESCRIPTION DERM: LOCATION DETAILED: RIGHT SUPERIOR PARIETAL SCALP

## 2024-05-29 ASSESSMENT — LOCATION SIMPLE DESCRIPTION DERM: LOCATION SIMPLE: SCALP

## 2024-05-29 ASSESSMENT — LOCATION ZONE DERM: LOCATION ZONE: SCALP

## 2024-05-29 NOTE — PROCEDURE: DIAGNOSIS COMMENT
Comment: Bx proven: (PERIVASCULAR DERMATITIS, WITH EOSINOPHILS) [id number] A & B
Detail Level: Detailed
Render Risk Assessment In Note?: no

## 2024-05-29 NOTE — PROCEDURE: ADDITIONAL NOTES
Detail Level: Simple
Additional Notes: Did have recent flare ongoing on hands and arms bilaterally. Does continue to respond to the betamethasone \\nContinues to achieve good control with betamethasone and feels he only needs to use for a few days to get control for quite some time.
Render Risk Assessment In Note?: no
Additional Notes: Recommended OTC Head & Shoulders shampoo

## 2024-05-29 NOTE — PROCEDURE: TREATMENT REGIMEN
Show [place] Line: Yes
Action 4: Continue
Continue Regimen: betamethasone dipropionate 0.05 % topical ointment BID\\nSig: Apply bid to rash up to 14 days, take a week off and retreat if needed.
Detail Level: Zone

## 2025-04-24 ENCOUNTER — APPOINTMENT (OUTPATIENT)
Dept: URBAN - METROPOLITAN AREA CLINIC 22 | Facility: CLINIC | Age: 80
Setting detail: DERMATOLOGY
End: 2025-04-24

## 2025-04-24 DIAGNOSIS — L30.8 OTHER SPECIFIED DERMATITIS: ICD-10-CM

## 2025-04-24 DIAGNOSIS — L81.4 OTHER MELANIN HYPERPIGMENTATION: ICD-10-CM

## 2025-04-24 DIAGNOSIS — L82.1 OTHER SEBORRHEIC KERATOSIS: ICD-10-CM

## 2025-04-24 PROCEDURE — 99213 OFFICE O/P EST LOW 20 MIN: CPT

## 2025-04-24 PROCEDURE — ? ADDITIONAL NOTES

## 2025-04-24 PROCEDURE — ? PRESCRIPTION

## 2025-04-24 PROCEDURE — ? COUNSELING

## 2025-04-24 PROCEDURE — ? TREATMENT REGIMEN

## 2025-04-24 PROCEDURE — ? DIAGNOSIS COMMENT

## 2025-04-24 RX ORDER — BETAMETHASONE DIPROPIONATE 0.5 MG/G
OINTMENT TOPICAL BID
Qty: 45 | Refills: 6 | Status: ERX

## 2025-04-24 ASSESSMENT — LOCATION DETAILED DESCRIPTION DERM
LOCATION DETAILED: LEFT LATERAL MALAR CHEEK
LOCATION DETAILED: RIGHT SUPERIOR MEDIAL MALAR CHEEK

## 2025-04-24 ASSESSMENT — LOCATION SIMPLE DESCRIPTION DERM
LOCATION SIMPLE: RIGHT CHEEK
LOCATION SIMPLE: LEFT CHEEK

## 2025-04-24 ASSESSMENT — LOCATION ZONE DERM: LOCATION ZONE: FACE

## 2025-04-24 NOTE — PROCEDURE: DIAGNOSIS COMMENT
Comment: Bx proven: (PERIVASCULAR DERMATITIS, WITH EOSINOPHILS) [id number] A & B
Detail Level: Detailed
Render Risk Assessment In Note?: no
Attending Only

## 2025-04-24 NOTE — PROCEDURE: ADDITIONAL NOTES
Detail Level: Simple
Additional Notes: 4/24/25: Pt is well controlled using Betamethasone. Requests refill today. \\n\\nPrior: Did have recent flare ongoing on hands and arms bilaterally. Does continue to respond to the betamethasone \\nContinues to achieve good control with betamethasone and feels he only needs to use for a few days to get control for quite some time.
Render Risk Assessment In Note?: no